# Patient Record
Sex: FEMALE | Race: WHITE | NOT HISPANIC OR LATINO | Employment: OTHER | URBAN - METROPOLITAN AREA
[De-identification: names, ages, dates, MRNs, and addresses within clinical notes are randomized per-mention and may not be internally consistent; named-entity substitution may affect disease eponyms.]

---

## 2017-01-03 LAB
ALBUMIN SERPL BCP-MCNC: 3.9 G/DL (ref 3.5–5)
ALP SERPL-CCNC: 57 U/L (ref 46–116)
ALT SERPL W P-5'-P-CCNC: 33 U/L (ref 12–78)
ANION GAP SERPL CALCULATED.3IONS-SCNC: 11 MMOL/L (ref 4–13)
ANISOCYTOSIS BLD QL SMEAR: PRESENT
AST SERPL W P-5'-P-CCNC: 21 U/L (ref 5–45)
BASOPHILS # BLD MANUAL: 0 THOUSAND/UL (ref 0–0.1)
BASOPHILS NFR MAR MANUAL: 0 % (ref 0–1)
BILIRUB SERPL-MCNC: 1.05 MG/DL (ref 0.2–1)
BUN SERPL-MCNC: 18 MG/DL (ref 5–25)
CALCIUM SERPL-MCNC: 9.9 MG/DL (ref 8.3–10.1)
CHLORIDE SERPL-SCNC: 101 MMOL/L (ref 100–108)
CO2 SERPL-SCNC: 26 MMOL/L (ref 21–32)
CREAT SERPL-MCNC: 1.04 MG/DL (ref 0.6–1.3)
EOSINOPHIL # BLD MANUAL: 0 THOUSAND/UL (ref 0–0.4)
EOSINOPHIL NFR BLD MANUAL: 0 % (ref 0–6)
ERYTHROCYTE [DISTWIDTH] IN BLOOD BY AUTOMATED COUNT: 13.2 % (ref 11.6–15.1)
GFR SERPL CREATININE-BSD FRML MDRD: 51.7 ML/MIN/1.73SQ M
GLUCOSE SERPL-MCNC: 145 MG/DL (ref 65–140)
HCT VFR BLD AUTO: 46.1 % (ref 34.8–46.1)
HGB BLD-MCNC: 16.1 G/DL (ref 11.5–15.4)
LIPASE SERPL-CCNC: 127 U/L (ref 73–393)
LYMPHOCYTES # BLD AUTO: 0.82 THOUSAND/UL (ref 0.6–4.47)
LYMPHOCYTES # BLD AUTO: 6 % (ref 14–44)
MCH RBC QN AUTO: 32.1 PG (ref 26.8–34.3)
MCHC RBC AUTO-ENTMCNC: 34.9 G/DL (ref 31.4–37.4)
MCV RBC AUTO: 92 FL (ref 82–98)
MONOCYTES # BLD AUTO: 0.27 THOUSAND/UL (ref 0–1.22)
MONOCYTES NFR BLD: 2 % (ref 4–12)
NEUTROPHILS # BLD MANUAL: 12.51 THOUSAND/UL (ref 1.85–7.62)
NEUTS SEG NFR BLD AUTO: 92 % (ref 43–75)
NRBC BLD AUTO-RTO: 0 /100 WBCS
PLATELET # BLD AUTO: 315 THOUSANDS/UL (ref 149–390)
PLATELET BLD QL SMEAR: ADEQUATE
PMV BLD AUTO: 9.7 FL (ref 8.9–12.7)
POTASSIUM SERPL-SCNC: 3.9 MMOL/L (ref 3.5–5.3)
PROT SERPL-MCNC: 7.8 G/DL (ref 6.4–8.2)
RBC # BLD AUTO: 5.02 MILLION/UL (ref 3.81–5.12)
RBC MORPH BLD: PRESENT
SODIUM SERPL-SCNC: 138 MMOL/L (ref 136–145)
WBC # BLD AUTO: 13.6 THOUSAND/UL (ref 4.31–10.16)

## 2017-01-03 PROCEDURE — 93005 ELECTROCARDIOGRAM TRACING: CPT

## 2017-01-03 PROCEDURE — 36415 COLL VENOUS BLD VENIPUNCTURE: CPT

## 2017-01-03 PROCEDURE — 85027 COMPLETE CBC AUTOMATED: CPT

## 2017-01-03 PROCEDURE — 83690 ASSAY OF LIPASE: CPT

## 2017-01-03 PROCEDURE — 80053 COMPREHEN METABOLIC PANEL: CPT

## 2017-01-03 PROCEDURE — 85007 BL SMEAR W/DIFF WBC COUNT: CPT

## 2017-01-04 ENCOUNTER — APPOINTMENT (EMERGENCY)
Dept: RADIOLOGY | Facility: HOSPITAL | Age: 77
End: 2017-01-04
Payer: MEDICARE

## 2017-01-04 ENCOUNTER — HOSPITAL ENCOUNTER (OUTPATIENT)
Facility: HOSPITAL | Age: 77
Setting detail: OBSERVATION
Discharge: HOME | End: 2017-01-05
Attending: EMERGENCY MEDICINE | Admitting: COLON & RECTAL SURGERY
Payer: MEDICARE

## 2017-01-04 DIAGNOSIS — K56.609 SMALL BOWEL OBSTRUCTION (HCC): Primary | ICD-10-CM

## 2017-01-04 DIAGNOSIS — R10.84 GENERALIZED ABDOMINAL PAIN: ICD-10-CM

## 2017-01-04 LAB
ATRIAL RATE: 72 BPM
P AXIS: 80 DEGREES
PR INTERVAL: 170 MS
QRS AXIS: 105 DEGREES
QRSD INTERVAL: 114 MS
QT INTERVAL: 456 MS
QTC INTERVAL: 499 MS
T WAVE AXIS: 66 DEGREES
VENTRICULAR RATE: 72 BPM

## 2017-01-04 PROCEDURE — 96361 HYDRATE IV INFUSION ADD-ON: CPT

## 2017-01-04 PROCEDURE — 96374 THER/PROPH/DIAG INJ IV PUSH: CPT

## 2017-01-04 PROCEDURE — 74177 CT ABD & PELVIS W/CONTRAST: CPT

## 2017-01-04 PROCEDURE — 96375 TX/PRO/DX INJ NEW DRUG ADDON: CPT

## 2017-01-04 PROCEDURE — 99285 EMERGENCY DEPT VISIT HI MDM: CPT

## 2017-01-04 RX ORDER — GABAPENTIN 100 MG/1
200 CAPSULE ORAL EVERY EVENING
Status: DISCONTINUED | OUTPATIENT
Start: 2017-01-04 | End: 2017-01-05 | Stop reason: HOSPADM

## 2017-01-04 RX ORDER — FLUOXETINE HYDROCHLORIDE 20 MG/1
40 CAPSULE ORAL DAILY
Status: DISCONTINUED | OUTPATIENT
Start: 2017-01-04 | End: 2017-01-05 | Stop reason: HOSPADM

## 2017-01-04 RX ORDER — ASPIRIN 81 MG/1
81 TABLET, CHEWABLE ORAL DAILY
Status: DISCONTINUED | OUTPATIENT
Start: 2017-01-04 | End: 2017-01-05 | Stop reason: HOSPADM

## 2017-01-04 RX ORDER — DOCUSATE SODIUM 100 MG/1
100 CAPSULE, LIQUID FILLED ORAL 2 TIMES DAILY PRN
Status: DISCONTINUED | OUTPATIENT
Start: 2017-01-04 | End: 2017-01-05

## 2017-01-04 RX ORDER — ASPIRIN 81 MG/1
81 TABLET, CHEWABLE ORAL DAILY
Status: DISCONTINUED | OUTPATIENT
Start: 2017-01-04 | End: 2017-01-04

## 2017-01-04 RX ORDER — AMLODIPINE BESYLATE 5 MG/1
5 TABLET ORAL DAILY
Status: DISCONTINUED | OUTPATIENT
Start: 2017-01-04 | End: 2017-01-04

## 2017-01-04 RX ORDER — LOPERAMIDE HYDROCHLORIDE 2 MG/1
2 CAPSULE ORAL 4 TIMES DAILY PRN
Status: DISCONTINUED | OUTPATIENT
Start: 2017-01-04 | End: 2017-01-05

## 2017-01-04 RX ORDER — LOPERAMIDE HYDROCHLORIDE 2 MG/1
2 CAPSULE ORAL 4 TIMES DAILY PRN
Status: ON HOLD | COMMUNITY
End: 2022-05-12

## 2017-01-04 RX ORDER — ONDANSETRON 2 MG/ML
4 INJECTION INTRAMUSCULAR; INTRAVENOUS ONCE
Status: COMPLETED | OUTPATIENT
Start: 2017-01-04 | End: 2017-01-04

## 2017-01-04 RX ORDER — SODIUM CHLORIDE 9 MG/ML
60 INJECTION, SOLUTION INTRAVENOUS CONTINUOUS
Status: DISCONTINUED | OUTPATIENT
Start: 2017-01-04 | End: 2017-01-05

## 2017-01-04 RX ORDER — AMLODIPINE BESYLATE 5 MG/1
5 TABLET ORAL DAILY
Status: DISCONTINUED | OUTPATIENT
Start: 2017-01-04 | End: 2017-01-05 | Stop reason: HOSPADM

## 2017-01-04 RX ORDER — KETOROLAC TROMETHAMINE 30 MG/ML
15 INJECTION, SOLUTION INTRAMUSCULAR; INTRAVENOUS ONCE
Status: COMPLETED | OUTPATIENT
Start: 2017-01-04 | End: 2017-01-04

## 2017-01-04 RX ADMIN — SODIUM CHLORIDE 1000 ML: 0.9 INJECTION, SOLUTION INTRAVENOUS at 01:40

## 2017-01-04 RX ADMIN — KETOROLAC TROMETHAMINE 15 MG: 30 INJECTION, SOLUTION INTRAMUSCULAR at 04:15

## 2017-01-04 RX ADMIN — AMLODIPINE BESYLATE 5 MG: 5 TABLET ORAL at 11:54

## 2017-01-04 RX ADMIN — GABAPENTIN 200 MG: 100 CAPSULE ORAL at 19:53

## 2017-01-04 RX ADMIN — FLUOXETINE 40 MG: 20 CAPSULE ORAL at 11:55

## 2017-01-04 RX ADMIN — ENOXAPARIN SODIUM 40 MG: 40 INJECTION SUBCUTANEOUS at 11:55

## 2017-01-04 RX ADMIN — ASPIRIN 81 MG 81 MG: 81 TABLET ORAL at 11:54

## 2017-01-04 RX ADMIN — ONDANSETRON 4 MG: 2 INJECTION INTRAMUSCULAR; INTRAVENOUS at 02:42

## 2017-01-04 RX ADMIN — SODIUM CHLORIDE 60 ML/HR: 0.9 INJECTION, SOLUTION INTRAVENOUS at 08:40

## 2017-01-04 RX ADMIN — IOHEXOL 100 ML: 350 INJECTION, SOLUTION INTRAVENOUS at 02:06

## 2017-01-05 VITALS
TEMPERATURE: 97.7 F | RESPIRATION RATE: 18 BRPM | OXYGEN SATURATION: 94 % | SYSTOLIC BLOOD PRESSURE: 170 MMHG | HEIGHT: 63 IN | WEIGHT: 135.14 LBS | DIASTOLIC BLOOD PRESSURE: 89 MMHG | HEART RATE: 69 BPM | BODY MASS INDEX: 23.95 KG/M2

## 2017-01-05 PROBLEM — R10.9 ABDOMINAL PAIN: Status: ACTIVE | Noted: 2017-01-05

## 2017-01-05 LAB
ANION GAP SERPL CALCULATED.3IONS-SCNC: 5 MMOL/L (ref 4–13)
BASOPHILS # BLD AUTO: 0.04 THOUSANDS/ΜL (ref 0–0.1)
BASOPHILS NFR BLD AUTO: 1 % (ref 0–1)
BUN SERPL-MCNC: 12 MG/DL (ref 5–25)
CALCIUM SERPL-MCNC: 8.3 MG/DL (ref 8.3–10.1)
CHLORIDE SERPL-SCNC: 106 MMOL/L (ref 100–108)
CO2 SERPL-SCNC: 29 MMOL/L (ref 21–32)
CREAT SERPL-MCNC: 0.78 MG/DL (ref 0.6–1.3)
EOSINOPHIL # BLD AUTO: 0.23 THOUSAND/ΜL (ref 0–0.61)
EOSINOPHIL NFR BLD AUTO: 4 % (ref 0–6)
ERYTHROCYTE [DISTWIDTH] IN BLOOD BY AUTOMATED COUNT: 13.2 % (ref 11.6–15.1)
GFR SERPL CREATININE-BSD FRML MDRD: >60 ML/MIN/1.73SQ M
GLUCOSE SERPL-MCNC: 87 MG/DL (ref 65–140)
HCT VFR BLD AUTO: 41 % (ref 34.8–46.1)
HGB BLD-MCNC: 13.6 G/DL (ref 11.5–15.4)
LYMPHOCYTES # BLD AUTO: 1.75 THOUSANDS/ΜL (ref 0.6–4.47)
LYMPHOCYTES NFR BLD AUTO: 28 % (ref 14–44)
MAGNESIUM SERPL-MCNC: 2.2 MG/DL (ref 1.6–2.6)
MCH RBC QN AUTO: 31 PG (ref 26.8–34.3)
MCHC RBC AUTO-ENTMCNC: 33.2 G/DL (ref 31.4–37.4)
MCV RBC AUTO: 93 FL (ref 82–98)
MONOCYTES # BLD AUTO: 0.55 THOUSAND/ΜL (ref 0.17–1.22)
MONOCYTES NFR BLD AUTO: 9 % (ref 4–12)
NEUTROPHILS # BLD AUTO: 3.57 THOUSANDS/ΜL (ref 1.85–7.62)
NEUTS SEG NFR BLD AUTO: 58 % (ref 43–75)
NRBC BLD AUTO-RTO: 0 /100 WBCS
PLATELET # BLD AUTO: 240 THOUSANDS/UL (ref 149–390)
PMV BLD AUTO: 9.9 FL (ref 8.9–12.7)
POTASSIUM SERPL-SCNC: 3.7 MMOL/L (ref 3.5–5.3)
RBC # BLD AUTO: 4.39 MILLION/UL (ref 3.81–5.12)
SODIUM SERPL-SCNC: 140 MMOL/L (ref 136–145)
WBC # BLD AUTO: 6.16 THOUSAND/UL (ref 4.31–10.16)

## 2017-01-05 PROCEDURE — 85025 COMPLETE CBC W/AUTO DIFF WBC: CPT | Performed by: SURGERY

## 2017-01-05 PROCEDURE — 80048 BASIC METABOLIC PNL TOTAL CA: CPT | Performed by: SURGERY

## 2017-01-05 PROCEDURE — 83735 ASSAY OF MAGNESIUM: CPT | Performed by: SURGERY

## 2017-01-05 RX ORDER — HEPARIN SODIUM 5000 [USP'U]/ML
5000 INJECTION, SOLUTION INTRAVENOUS; SUBCUTANEOUS EVERY 8 HOURS SCHEDULED
Status: DISCONTINUED | OUTPATIENT
Start: 2017-01-05 | End: 2017-01-05 | Stop reason: HOSPADM

## 2017-01-05 RX ADMIN — AMLODIPINE BESYLATE 5 MG: 5 TABLET ORAL at 08:14

## 2017-01-05 RX ADMIN — FLUOXETINE 40 MG: 20 CAPSULE ORAL at 08:14

## 2017-01-05 RX ADMIN — HEPARIN SODIUM 5000 UNITS: 5000 INJECTION, SOLUTION INTRAVENOUS; SUBCUTANEOUS at 06:02

## 2017-01-05 RX ADMIN — ASPIRIN 81 MG 81 MG: 81 TABLET ORAL at 08:14

## 2017-01-17 ENCOUNTER — GENERIC CONVERSION - ENCOUNTER (OUTPATIENT)
Dept: OTHER | Facility: OTHER | Age: 77
End: 2017-01-17

## 2017-02-27 ENCOUNTER — LAB REQUISITION (OUTPATIENT)
Dept: LAB | Facility: HOSPITAL | Age: 77
End: 2017-02-27
Payer: MEDICARE

## 2017-02-27 ENCOUNTER — ALLSCRIPTS OFFICE VISIT (OUTPATIENT)
Dept: OTHER | Facility: OTHER | Age: 77
End: 2017-02-27

## 2017-02-27 DIAGNOSIS — Z01.419 ENCOUNTER FOR GYNECOLOGICAL EXAMINATION WITHOUT ABNORMAL FINDING: ICD-10-CM

## 2017-02-27 PROCEDURE — G0145 SCR C/V CYTO,THINLAYER,RESCR: HCPCS | Performed by: OBSTETRICS & GYNECOLOGY

## 2017-03-06 ENCOUNTER — GENERIC CONVERSION - ENCOUNTER (OUTPATIENT)
Dept: OTHER | Facility: OTHER | Age: 77
End: 2017-03-06

## 2017-03-06 LAB
LAB AP GYN PRIMARY INTERPRETATION: NORMAL
Lab: NORMAL

## 2017-03-30 ENCOUNTER — TRANSCRIBE ORDERS (OUTPATIENT)
Dept: ADMINISTRATIVE | Facility: HOSPITAL | Age: 77
End: 2017-03-30

## 2017-03-30 DIAGNOSIS — Z12.31 VISIT FOR SCREENING MAMMOGRAM: Primary | ICD-10-CM

## 2017-04-26 DIAGNOSIS — Z12.31 ENCOUNTER FOR SCREENING MAMMOGRAM FOR MALIGNANT NEOPLASM OF BREAST: ICD-10-CM

## 2017-05-24 ENCOUNTER — HOSPITAL ENCOUNTER (OUTPATIENT)
Dept: BONE DENSITY | Facility: IMAGING CENTER | Age: 77
Discharge: HOME | End: 2017-05-24
Payer: MEDICARE

## 2017-05-24 DIAGNOSIS — Z12.31 ENCOUNTER FOR SCREENING MAMMOGRAM FOR MALIGNANT NEOPLASM OF BREAST: ICD-10-CM

## 2017-05-24 PROCEDURE — G0202 SCR MAMMO BI INCL CAD: HCPCS

## 2017-09-13 ENCOUNTER — TRANSCRIBE ORDERS (OUTPATIENT)
Dept: LAB | Facility: CLINIC | Age: 77
End: 2017-09-13

## 2017-09-13 ENCOUNTER — APPOINTMENT (OUTPATIENT)
Dept: LAB | Facility: CLINIC | Age: 77
End: 2017-09-13
Payer: MEDICARE

## 2017-09-13 DIAGNOSIS — M25.561 RIGHT KNEE PAIN, UNSPECIFIED CHRONICITY: Primary | ICD-10-CM

## 2017-09-13 DIAGNOSIS — E78.5 OTHER AND UNSPECIFIED HYPERLIPIDEMIA: ICD-10-CM

## 2017-09-13 DIAGNOSIS — M25.561 RIGHT KNEE PAIN, UNSPECIFIED CHRONICITY: ICD-10-CM

## 2017-09-13 DIAGNOSIS — I10 UNSPECIFIED ESSENTIAL HYPERTENSION: ICD-10-CM

## 2017-09-13 LAB
ALBUMIN SERPL BCP-MCNC: 3.3 G/DL (ref 3.5–5)
ALP SERPL-CCNC: 46 U/L (ref 46–116)
ALT SERPL W P-5'-P-CCNC: 33 U/L (ref 12–78)
ANION GAP SERPL CALCULATED.3IONS-SCNC: 6 MMOL/L (ref 4–13)
AST SERPL W P-5'-P-CCNC: 36 U/L (ref 5–45)
BILIRUB SERPL-MCNC: 0.46 MG/DL (ref 0.2–1)
BUN SERPL-MCNC: 20 MG/DL (ref 5–25)
CALCIUM SERPL-MCNC: 9.2 MG/DL (ref 8.3–10.1)
CHLORIDE SERPL-SCNC: 106 MMOL/L (ref 100–108)
CO2 SERPL-SCNC: 28 MMOL/L (ref 21–32)
CREAT SERPL-MCNC: 0.86 MG/DL (ref 0.6–1.3)
GFR SERPL CREATININE-BSD FRML MDRD: 66 ML/MIN/1.73SQ M
GLUCOSE P FAST SERPL-MCNC: 85 MG/DL (ref 65–99)
POTASSIUM SERPL-SCNC: 4.2 MMOL/L (ref 3.5–5.3)
PROT SERPL-MCNC: 6.9 G/DL (ref 6.4–8.2)
SODIUM SERPL-SCNC: 140 MMOL/L (ref 136–145)

## 2017-09-13 PROCEDURE — 80053 COMPREHEN METABOLIC PANEL: CPT

## 2017-09-13 PROCEDURE — 36415 COLL VENOUS BLD VENIPUNCTURE: CPT

## 2018-01-10 NOTE — PROCEDURES
Procedures by Adela Cruz MD at 2016   3:34 PM      Author:  Adela Cruz MD Service:  Neurology Author Type:  Physician     Filed:  2016  3:37 PM Date of Service:  2016  3:34 PM Status:  Signed     :  Adela Cruz MD (Physician)            ELECTROENCEPHALOGRAM (EEG)      Patient Name:  Unruly Melo  MRN: [id number]   :  1940 File #: Albina Bene 17-80   Age: 77 y o  Encounter #: [id number]   Date performed: 2016            Report date: 2016          Study type: Routine EEG    ICD 10 diagnosis: Transient alteration of awareness R40 4    Start time: 14:27 End time: 14:57     -------------------------------------------------------------------------------------------------------------------   Patient History: This recording was observed in a 77 y o  female with an episode of transient  global amnesia to evaluate for risk of seizure  Medications include:   amLODIPine 5 mg Oral Daily   aspirin 81 mg Oral Daily   atorvastatin 20 mg Oral Daily With Dinner   calcium carbonate-vitamin D 1 tablet Oral Daily   cholecalciferol 1,000 Units Oral Daily   vitamin B-12 500 mcg Oral Daily   enoxaparin 40 mg Subcutaneous Daily   fish oil 2,000 mg Oral Daily   FLUoxetine 40 mg Oral Daily   gabapentin 200 mg Oral QPM   loperamide 2 mg Oral BID   multivitamin-minerals 1 tablet Oral Daily       -------------------------------------------------------------------------------------------------------------------   Description of Procedure:  ·  32 channel digital recording with electrodes placed according to the International 10-20 system with additional  T1/T2 electrodes, EOG, EKG, and simultaneous  video  The recording was technically satisfactory  -------------------------------------------------------------------------------------------------------------------   EEG Description:    The recording was performed with the patient awake, drowsy, and asleep  She was fully oriented      During wakefulness, there were long runs of well regulated, low amplitude, posteriorly  dominant, symmetric 9-10 cps alpha rhythm that attenuated with eye opening  There were symmetric low amplitude, frontally dominant beta activities  With drowsiness, alpha activity attenuated and was replaced by diffusely distributed theta and beta activities  With sleep, symmetric vertex sharp waves,  POSTS, and sleep spindles were present      -------------------------------------------------------------------------------------------------------------------   Activation Procedures:  Hyperventilation was not performed  Stepped photic stimulation between 1-20 cps was performed and induced symmetric  photic driving  Other findings: The single lead ECG demonstrated a regular rhythm     -------------------------------------------------------------------------------------------------------------------   EEG Interpretation: This Routine EEG recorded during wakefulness, drowsiness, and sleep is normal         Victor Hugo Rowell MD   [address] [facility]               Received for:Andrew WELCH    Jul 11 2016  3:36PM [facility] Standard Time

## 2018-01-10 NOTE — RESULT NOTES
Verified Results  * MAMMO SCREENING BILATERAL W CAD [id number] 10:29AM Quillian Civil Order Number: [id number]    - Patient Instructions: To schedule this appointment, please contact Central Scheduling at [phone]  Do not wear any perfume, powder, lotion or deodorant on breast or underarm area  Please bring your doctors order, referral (if needed) and insurance information with you on the day of the test  Failure to bring this information may result in this test being rescheduled  Arrive 15 minutes prior to your appointment time to register  On the day of your test, please bring any prior mammogram or breast studies with you that were not performed at a [facility]  Failure to bring prior exams may result in your test needing to be rescheduled  Test Name Result Flag Reference   MAMMO SCREENING BILATERAL W CAD (Report)     Patient History:   Patient is postmenopausal    Family history of prostate cancer at age 80 in father, breast    cancer at age 76 in maternal aunt  Excisional biopsy of the left breast    Patient is a former smoker  Patient's BMI is 24 3  Reason for exam: screening, asymptomatic  Mammo Screening Bilateral W CAD: May 24, 2017 - Check In #:    [de-identified]   Bilateral MLO and CC view(s) were taken  Technologist: JOHANA Michelle (JOHANA)(M)   Prior study comparison: April 26, 2016, mammo screening bilateral   W CAD performed at [facility]  April 22, 2015, bilateral OPMA digital scrn mammo w/CAD    performed at [address]  April 11, 2014, bilateral OPMA digital scrn mammo w/CAD performed at    [facility]  April 10, 2013,    bilateral OPMA digital scrn mammo w/CAD performed at [facility]  There are scattered fibroglandular densities  The parenchymal pattern appears stable   No dominant soft tissue    mass or suspicious calcifications are noted  The skin and nipple   contours are within normal limits  No mammographic evidence of malignancy  No    significant changes when compared with prior studies  ACR BI-RADSï¾® Assessments: BiRad:1 - Negative     Recommendation:   Routine screening mammogram in 1 year  A reminder letter will be   scheduled  Analyzed by CAD     8-10% of cancers will be missed on mammography  Management of a    palpable abnormality must be based on clinical grounds  Patients   will be notified of their results via letter from our facility  Accredited by [employer] and FDA       Transcription Location: [address] [place] [address]: [id number]     Risk Value(s):   Tyrer-Cuzick 10 Year: 2 500%, Tyrer-Cuzick Lifetime: 2 500%,    Myriad Table: 1 5%, JELLY 5 Year: 2 3%, NCI Lifetime: 4 9%   Signed by:   Sharyle Ben, MD   5/24/17

## 2018-01-10 NOTE — RESULT NOTES
Verified Results  (1) THIN PREP PAP WITH IMAGING [id number] 11:40AM Karan Singleton Order Number: [id number]     Test Name Result Flag Reference   LAB AP CASE REPORT (Report)     Gynecologic Cytology Report            Case: [id number]                  Authorizing Provider: Radha Leslie MD     Collected:      02/27/2017 1140        First Screen:     GAVI Lopez    Received:      02/28/2017 1522        Specimen:  LIQUID-BASED PAP, SCREENING, Endocervical   LAB AP GYN PRIMARY INTERPRETATION      Negative for intraepithelial lesion or malignancy  Electronically signed by GAVI Lopez on 3/6/2017 at 2:22 PM   LAB AP GYN SPECIMEN ADEQUACY      Satisfactory for evaluation  Endocervical/transformation zone component present  LAB AP GYN ADDITIONAL INFORMATION (Report)     [employer]'s FDA approved ,  and ThinPrep Imaging System are   utilized with strict adherence to the 's instruction manual to   prepare gynecologic and non-gynecologic cytology specimens for the   production of ThinPrep slides as well as for gynecologic ThinPrep imaging  These processes have been validated by our laboratory and/or by the     The Pap test is not a diagnostic procedure and should not be used as the   sole means to detect cervical cancer  It is only a screening procedure to   aid in the detection of cervical cancer and its precursors  Both   false-negative and false-positive results have been experienced  Your   patient's test result should be interpreted in this context together with   the history and clinical findings     LAB AP LMP

## 2018-01-10 NOTE — RESULT NOTES
Verified Results  * MAMMO SCREENING BILATERAL W CAD 26Apr2016 10:54AM Shayla Desai Order Number: [id number]     Test Name Result Flag Reference   MAMMO SCREENING BILATERAL W CAD (Report)     Patient History:   Patient is postmenopausal    Family history of prostate cancer in father at age 80 and breast    cancer in maternal aunt at age 76  Excisional biopsy of the left breast    Patient is a former smoker  Patient's BMI is 24 3  Reason for exam: screening (asymptomatic)  Mammo Screening Bilateral W CAD: April 26, 2016 - Check In #:    [de-identified]   Bilateral MLO, CC, and XCCL view(s) were taken  Technologist: JOHANA Bryant (R)(M)   Prior study comparison: April 22, 2015, bilateral OPMA digital    scrn mammo w/CAD performed at [id number] [place] [address] [address]  April 11, 2014, bilateral OPMA digital scrn    mammo w/CAD performed at [address] [address]  April 10, 2013, bilateral OPMA digital scrn mammo w/CAD    performed at [address] [address]  March 29, 2012, bilateral OPMA digital scrn mammo w/CAD performed at    [address] [address]  March 3, 2011,    bilateral OPMA digital scrn mammo w/CAD performed at [address] [address]  There are scattered fibroglandular densities  The parenchymal pattern appears stable  No dominant soft tissue    mass or suspicious calcifications are noted  The skin and nipple   contours are within normal limits  No mammographic evidence of malignancy  No    significant changes when compared with prior studies  ASSESSMENT: BiRad:1 - Negative     Recommendation:   Routine screening mammogram in 1 year  A reminder letter will be   scheduled  Analyzed by CAD     8-10% of cancers will be missed on mammography  Management of a    palpable abnormality must be based on clinical grounds   Patients   will be notified of their results via letter from our facility  Accredited by [employer] and FDA       Transcription Location: JOHANA Aguilar 98: [id number]     Risk Value(s):   Tyrer-Cuzick 10 Year: 2 845%, Tyrer-Cuzick Lifetime: 3 162%,    Myriad Table: 1 5%, JELLY 5 Year: 2 3%, NCI Lifetime: 5 2%   Signed by:   Amilcar Bonds MD   4/26/16

## 2018-01-13 VITALS
BODY MASS INDEX: 22.93 KG/M2 | SYSTOLIC BLOOD PRESSURE: 130 MMHG | DIASTOLIC BLOOD PRESSURE: 76 MMHG | HEIGHT: 64 IN | WEIGHT: 134.31 LBS

## 2018-05-04 ENCOUNTER — TRANSCRIBE ORDERS (OUTPATIENT)
Dept: ADMINISTRATIVE | Facility: HOSPITAL | Age: 78
End: 2018-05-04

## 2018-05-04 DIAGNOSIS — Z12.39 BREAST SCREENING: Primary | ICD-10-CM

## 2018-05-29 ENCOUNTER — HOSPITAL ENCOUNTER (OUTPATIENT)
Dept: BONE DENSITY | Facility: IMAGING CENTER | Age: 78
Discharge: HOME | End: 2018-05-29
Payer: MEDICARE

## 2018-05-29 DIAGNOSIS — Z12.39 BREAST SCREENING: ICD-10-CM

## 2018-05-29 PROCEDURE — 77067 SCR MAMMO BI INCL CAD: CPT

## 2018-07-26 ENCOUNTER — TELEPHONE (OUTPATIENT)
Dept: OBGYN CLINIC | Facility: CLINIC | Age: 78
End: 2018-07-26

## 2018-07-26 NOTE — TELEPHONE ENCOUNTER
----- Message from Christofer Carolina MD sent at 7/26/2018  9:15 AM EDT -----  Please call the patient regarding her DEXA scan  Her DEXA scan showed evidence of osteopenia  This is best treated with calcium vitamin-D and exercise

## 2019-03-08 ENCOUNTER — ANNUAL EXAM (OUTPATIENT)
Dept: OBGYN CLINIC | Facility: CLINIC | Age: 79
End: 2019-03-08
Payer: MEDICARE

## 2019-03-08 VITALS
SYSTOLIC BLOOD PRESSURE: 130 MMHG | DIASTOLIC BLOOD PRESSURE: 72 MMHG | WEIGHT: 130 LBS | BODY MASS INDEX: 23.04 KG/M2 | HEIGHT: 63 IN

## 2019-03-08 DIAGNOSIS — Z12.31 ENCOUNTER FOR SCREENING MAMMOGRAM FOR MALIGNANT NEOPLASM OF BREAST: Primary | ICD-10-CM

## 2019-03-08 DIAGNOSIS — Z01.419 ENCOUNTER FOR GYNECOLOGICAL EXAMINATION (GENERAL) (ROUTINE) WITHOUT ABNORMAL FINDINGS: ICD-10-CM

## 2019-03-08 PROCEDURE — G0101 CA SCREEN;PELVIC/BREAST EXAM: HCPCS | Performed by: OBSTETRICS & GYNECOLOGY

## 2019-03-08 PROCEDURE — G0145 SCR C/V CYTO,THINLAYER,RESCR: HCPCS | Performed by: OBSTETRICS & GYNECOLOGY

## 2019-03-08 RX ORDER — LOPERAMIDE HYDROCHLORIDE 2 MG/1
TABLET ORAL
COMMUNITY
Start: 2013-01-16 | End: 2019-03-08

## 2019-03-08 RX ORDER — DULOXETIN HYDROCHLORIDE 20 MG/1
20 CAPSULE, DELAYED RELEASE ORAL DAILY
COMMUNITY

## 2019-03-08 RX ORDER — GABAPENTIN 100 MG/1
CAPSULE ORAL DAILY
COMMUNITY
Start: 2011-04-02

## 2019-03-08 RX ORDER — CALCIUM CARBONATE/VITAMIN D3 600 MG-10
TABLET ORAL
COMMUNITY
End: 2019-03-08

## 2019-03-08 RX ORDER — AMLODIPINE BESYLATE 5 MG/1
1 TABLET ORAL DAILY
COMMUNITY
End: 2019-03-08

## 2019-03-08 RX ORDER — TRAZODONE HYDROCHLORIDE 150 MG/1
TABLET ORAL
Refills: 5 | COMMUNITY
Start: 2019-02-22

## 2019-03-08 NOTE — PROGRESS NOTES
Assessment/Plan: This 60-year-old patient is seen today for gyn evaluation  She does wear liners daily for urine stress incontinence  No problem-specific Assessment & Plan notes found for this encounter  Subjective:      Patient ID: Tanika Kruger is a 69 y o  female  Review of Systems   Constitutional: Negative  HENT: Negative  Eyes: Negative  Respiratory: Negative  Cardiovascular: Negative  Gastrointestinal: Negative  She does have 5-6 bowel movements daily  Endocrine: Negative  Genitourinary: Negative  She does wear liners daily for urine stress incontinence  Musculoskeletal: Negative  Skin: Negative  Allergic/Immunologic: Negative  Neurological: Negative  Hematological: Negative  Psychiatric/Behavioral: Negative  Objective:      /72 (BP Location: Left arm, Patient Position: Sitting, Cuff Size: Standard)   Ht 5' 2 5" (1 588 m)   Wt 59 kg (130 lb)   BMI 23 40 kg/m²          Physical Exam   Constitutional: She is oriented to person, place, and time  She appears well-developed and well-nourished  HENT:   Head: Normocephalic  Neck: Normal range of motion  Neck supple  Cardiovascular: Normal rate, regular rhythm, normal heart sounds and intact distal pulses  Pulmonary/Chest: Effort normal and breath sounds normal    Abdominal: Soft  Bowel sounds are normal    Genitourinary: Uterus normal    Musculoskeletal: Normal range of motion  Neurological: She is alert and oriented to person, place, and time  Skin: Skin is warm and dry  Psychiatric: She has a normal mood and affect  Nursing note and vitals reviewed  breast exam is normal  Pelvic exam reveals uterus to be anterior mobile normal size and well supported  No adnexal masses are present  The cervix is normal to appearance  The vaginal mucosa is atrophic  There is a caruncle at the urethra    Vulvar structures are normal  Rectal exam shows no masses or blood in the rectum and no nodularity in the cul-de-sac

## 2019-03-08 NOTE — PATIENT INSTRUCTIONS
This 67-year-old patient was told that her breast and pelvic exam are normal she will call if she sees any vaginal bleeding

## 2019-03-12 LAB
LAB AP GYN PRIMARY INTERPRETATION: NORMAL
Lab: NORMAL

## 2019-06-11 ENCOUNTER — HOSPITAL ENCOUNTER (OUTPATIENT)
Dept: BONE DENSITY | Facility: IMAGING CENTER | Age: 79
Discharge: HOME | End: 2019-06-11
Payer: MEDICARE

## 2019-06-11 VITALS — BODY MASS INDEX: 22.15 KG/M2 | HEIGHT: 63 IN | WEIGHT: 125 LBS

## 2019-06-11 DIAGNOSIS — Z12.31 ENCOUNTER FOR SCREENING MAMMOGRAM FOR MALIGNANT NEOPLASM OF BREAST: ICD-10-CM

## 2019-06-11 PROCEDURE — 77067 SCR MAMMO BI INCL CAD: CPT

## 2020-05-21 ENCOUNTER — TRANSCRIBE ORDERS (OUTPATIENT)
Dept: ADMINISTRATIVE | Facility: HOSPITAL | Age: 80
End: 2020-05-21

## 2020-05-21 DIAGNOSIS — M85.88 OSTEOPENIA OF OTHER SITE: ICD-10-CM

## 2020-05-21 DIAGNOSIS — Z12.31 ENCOUNTER FOR SCREENING MAMMOGRAM FOR MALIGNANT NEOPLASM OF BREAST: Primary | ICD-10-CM

## 2020-06-22 ENCOUNTER — HOSPITAL ENCOUNTER (OUTPATIENT)
Dept: BONE DENSITY | Facility: IMAGING CENTER | Age: 80
Discharge: HOME | End: 2020-06-22
Payer: MEDICARE

## 2020-06-22 VITALS — WEIGHT: 122 LBS | HEIGHT: 63 IN | BODY MASS INDEX: 21.62 KG/M2

## 2020-06-22 DIAGNOSIS — Z12.31 ENCOUNTER FOR SCREENING MAMMOGRAM FOR MALIGNANT NEOPLASM OF BREAST: ICD-10-CM

## 2020-06-22 DIAGNOSIS — M85.88 OSTEOPENIA OF OTHER SITE: ICD-10-CM

## 2020-06-22 PROCEDURE — 77067 SCR MAMMO BI INCL CAD: CPT

## 2020-06-22 PROCEDURE — 77063 BREAST TOMOSYNTHESIS BI: CPT

## 2020-06-22 PROCEDURE — 77080 DXA BONE DENSITY AXIAL: CPT

## 2021-01-21 ENCOUNTER — IMMUNIZATIONS (OUTPATIENT)
Dept: FAMILY MEDICINE CLINIC | Facility: HOSPITAL | Age: 81
End: 2021-01-21

## 2021-01-21 DIAGNOSIS — Z23 ENCOUNTER FOR IMMUNIZATION: Primary | ICD-10-CM

## 2021-01-21 PROCEDURE — 0001A SARS-COV-2 / COVID-19 MRNA VACCINE (PFIZER-BIONTECH) 30 MCG: CPT

## 2021-01-21 PROCEDURE — 91300 SARS-COV-2 / COVID-19 MRNA VACCINE (PFIZER-BIONTECH) 30 MCG: CPT

## 2021-02-11 ENCOUNTER — IMMUNIZATIONS (OUTPATIENT)
Dept: FAMILY MEDICINE CLINIC | Facility: HOSPITAL | Age: 81
End: 2021-02-11

## 2021-02-11 DIAGNOSIS — Z23 ENCOUNTER FOR IMMUNIZATION: Primary | ICD-10-CM

## 2021-02-11 PROCEDURE — 91300 SARS-COV-2 / COVID-19 MRNA VACCINE (PFIZER-BIONTECH) 30 MCG: CPT

## 2021-02-11 PROCEDURE — 0002A SARS-COV-2 / COVID-19 MRNA VACCINE (PFIZER-BIONTECH) 30 MCG: CPT

## 2021-08-12 ENCOUNTER — HOSPITAL ENCOUNTER (OUTPATIENT)
Dept: MAMMOGRAPHY | Facility: IMAGING CENTER | Age: 81
Discharge: HOME | End: 2021-08-12
Payer: MEDICARE

## 2021-08-12 VITALS — WEIGHT: 122 LBS | HEIGHT: 63 IN | BODY MASS INDEX: 21.62 KG/M2

## 2021-08-12 DIAGNOSIS — Z12.31 ENCOUNTER FOR SCREENING MAMMOGRAM FOR MALIGNANT NEOPLASM OF BREAST: ICD-10-CM

## 2021-08-12 PROCEDURE — 77067 SCR MAMMO BI INCL CAD: CPT

## 2021-08-12 PROCEDURE — 77063 BREAST TOMOSYNTHESIS BI: CPT

## 2022-05-11 ENCOUNTER — APPOINTMENT (EMERGENCY)
Dept: RADIOLOGY | Facility: HOSPITAL | Age: 82
DRG: 390 | End: 2022-05-11
Payer: MEDICARE

## 2022-05-11 ENCOUNTER — APPOINTMENT (INPATIENT)
Dept: RADIOLOGY | Facility: HOSPITAL | Age: 82
DRG: 390 | End: 2022-05-11
Payer: MEDICARE

## 2022-05-11 ENCOUNTER — HOSPITAL ENCOUNTER (INPATIENT)
Facility: HOSPITAL | Age: 82
LOS: 1 days | Discharge: HOME | DRG: 390 | End: 2022-05-12
Attending: EMERGENCY MEDICINE | Admitting: COLON & RECTAL SURGERY
Payer: MEDICARE

## 2022-05-11 DIAGNOSIS — K56.609 SBO (SMALL BOWEL OBSTRUCTION) (HCC): Primary | ICD-10-CM

## 2022-05-11 DIAGNOSIS — R11.2 NAUSEA AND VOMITING, UNSPECIFIED VOMITING TYPE: ICD-10-CM

## 2022-05-11 DIAGNOSIS — R10.33 PERIUMBILICAL ABDOMINAL PAIN: ICD-10-CM

## 2022-05-11 LAB
ALBUMIN SERPL BCP-MCNC: 3.7 G/DL (ref 3.5–5)
ALP SERPL-CCNC: 46 U/L (ref 46–116)
ALT SERPL W P-5'-P-CCNC: 38 U/L (ref 12–78)
ANION GAP SERPL CALCULATED.3IONS-SCNC: 4 MMOL/L (ref 4–13)
AST SERPL W P-5'-P-CCNC: 56 U/L (ref 5–45)
BASOPHILS # BLD AUTO: 0.03 THOUSANDS/ΜL (ref 0–0.1)
BASOPHILS NFR BLD AUTO: 0 % (ref 0–1)
BILIRUB SERPL-MCNC: 0.8 MG/DL (ref 0.2–1)
BUN SERPL-MCNC: 25 MG/DL (ref 5–25)
CALCIUM SERPL-MCNC: 9.5 MG/DL (ref 8.3–10.1)
CHLORIDE SERPL-SCNC: 100 MMOL/L (ref 100–108)
CO2 SERPL-SCNC: 31 MMOL/L (ref 21–32)
CREAT SERPL-MCNC: 0.98 MG/DL (ref 0.6–1.3)
EOSINOPHIL # BLD AUTO: 0.01 THOUSAND/ΜL (ref 0–0.61)
EOSINOPHIL NFR BLD AUTO: 0 % (ref 0–6)
ERYTHROCYTE [DISTWIDTH] IN BLOOD BY AUTOMATED COUNT: 13.5 % (ref 11.6–15.1)
GFR SERPL CREATININE-BSD FRML MDRD: 54 ML/MIN/1.73SQ M
GLUCOSE SERPL-MCNC: 146 MG/DL (ref 65–140)
HCT VFR BLD AUTO: 43.2 % (ref 34.8–46.1)
HGB BLD-MCNC: 13.9 G/DL (ref 11.5–15.4)
IMM GRANULOCYTES # BLD AUTO: 0.03 THOUSAND/UL (ref 0–0.2)
IMM GRANULOCYTES NFR BLD AUTO: 0 % (ref 0–2)
LIPASE SERPL-CCNC: 143 U/L (ref 73–393)
LYMPHOCYTES # BLD AUTO: 0.57 THOUSANDS/ΜL (ref 0.6–4.47)
LYMPHOCYTES NFR BLD AUTO: 5 % (ref 14–44)
MCH RBC QN AUTO: 29.5 PG (ref 26.8–34.3)
MCHC RBC AUTO-ENTMCNC: 32.2 G/DL (ref 31.4–37.4)
MCV RBC AUTO: 92 FL (ref 82–98)
MONOCYTES # BLD AUTO: 0.34 THOUSAND/ΜL (ref 0.17–1.22)
MONOCYTES NFR BLD AUTO: 3 % (ref 4–12)
NEUTROPHILS # BLD AUTO: 10.44 THOUSANDS/ΜL (ref 1.85–7.62)
NEUTS SEG NFR BLD AUTO: 92 % (ref 43–75)
NRBC BLD AUTO-RTO: 0 /100 WBCS
PLATELET # BLD AUTO: 287 THOUSANDS/UL (ref 149–390)
PMV BLD AUTO: 10.6 FL (ref 8.9–12.7)
POTASSIUM SERPL-SCNC: 4.1 MMOL/L (ref 3.5–5.3)
PROT SERPL-MCNC: 7.3 G/DL (ref 6.4–8.2)
RBC # BLD AUTO: 4.71 MILLION/UL (ref 3.81–5.12)
SODIUM SERPL-SCNC: 135 MMOL/L (ref 136–145)
WBC # BLD AUTO: 11.42 THOUSAND/UL (ref 4.31–10.16)

## 2022-05-11 PROCEDURE — 83690 ASSAY OF LIPASE: CPT | Performed by: EMERGENCY MEDICINE

## 2022-05-11 PROCEDURE — 1123F ACP DISCUSS/DSCN MKR DOCD: CPT | Performed by: EMERGENCY MEDICINE

## 2022-05-11 PROCEDURE — G1004 CDSM NDSC: HCPCS

## 2022-05-11 PROCEDURE — 96376 TX/PRO/DX INJ SAME DRUG ADON: CPT

## 2022-05-11 PROCEDURE — 80053 COMPREHEN METABOLIC PANEL: CPT | Performed by: EMERGENCY MEDICINE

## 2022-05-11 PROCEDURE — 96374 THER/PROPH/DIAG INJ IV PUSH: CPT

## 2022-05-11 PROCEDURE — 99285 EMERGENCY DEPT VISIT HI MDM: CPT

## 2022-05-11 PROCEDURE — 96361 HYDRATE IV INFUSION ADD-ON: CPT

## 2022-05-11 PROCEDURE — 99285 EMERGENCY DEPT VISIT HI MDM: CPT | Performed by: EMERGENCY MEDICINE

## 2022-05-11 PROCEDURE — 71045 X-RAY EXAM CHEST 1 VIEW: CPT

## 2022-05-11 PROCEDURE — 74176 CT ABD & PELVIS W/O CONTRAST: CPT

## 2022-05-11 PROCEDURE — 36415 COLL VENOUS BLD VENIPUNCTURE: CPT | Performed by: EMERGENCY MEDICINE

## 2022-05-11 PROCEDURE — 85025 COMPLETE CBC W/AUTO DIFF WBC: CPT | Performed by: EMERGENCY MEDICINE

## 2022-05-11 RX ORDER — HYDROMORPHONE HCL IN WATER/PF 6 MG/30 ML
0.2 PATIENT CONTROLLED ANALGESIA SYRINGE INTRAVENOUS EVERY 4 HOURS PRN
Status: DISCONTINUED | OUTPATIENT
Start: 2022-05-11 | End: 2022-05-12 | Stop reason: HOSPADM

## 2022-05-11 RX ORDER — LABETALOL HYDROCHLORIDE 5 MG/ML
10 INJECTION, SOLUTION INTRAVENOUS EVERY 4 HOURS PRN
Status: DISCONTINUED | OUTPATIENT
Start: 2022-05-11 | End: 2022-05-12 | Stop reason: HOSPADM

## 2022-05-11 RX ORDER — HYDROMORPHONE HCL/PF 1 MG/ML
0.5 SYRINGE (ML) INJECTION ONCE
Status: COMPLETED | OUTPATIENT
Start: 2022-05-11 | End: 2022-05-11

## 2022-05-11 RX ORDER — SODIUM CHLORIDE, SODIUM LACTATE, POTASSIUM CHLORIDE, CALCIUM CHLORIDE 600; 310; 30; 20 MG/100ML; MG/100ML; MG/100ML; MG/100ML
125 INJECTION, SOLUTION INTRAVENOUS CONTINUOUS
Status: DISCONTINUED | OUTPATIENT
Start: 2022-05-11 | End: 2022-05-12

## 2022-05-11 RX ORDER — HYDROMORPHONE HCL/PF 1 MG/ML
0.5 SYRINGE (ML) INJECTION EVERY 4 HOURS PRN
Status: DISCONTINUED | OUTPATIENT
Start: 2022-05-11 | End: 2022-05-12

## 2022-05-11 RX ORDER — HEPARIN SODIUM 5000 [USP'U]/ML
5000 INJECTION, SOLUTION INTRAVENOUS; SUBCUTANEOUS EVERY 8 HOURS SCHEDULED
Status: DISCONTINUED | OUTPATIENT
Start: 2022-05-11 | End: 2022-05-12 | Stop reason: HOSPADM

## 2022-05-11 RX ORDER — ONDANSETRON 2 MG/ML
1 INJECTION INTRAMUSCULAR; INTRAVENOUS ONCE
Status: COMPLETED | OUTPATIENT
Start: 2022-05-11 | End: 2022-05-11

## 2022-05-11 RX ORDER — ONDANSETRON 2 MG/ML
4 INJECTION INTRAMUSCULAR; INTRAVENOUS EVERY 6 HOURS PRN
Status: DISCONTINUED | OUTPATIENT
Start: 2022-05-11 | End: 2022-05-12 | Stop reason: HOSPADM

## 2022-05-11 RX ORDER — XYLITOL/YERBA SANTA
5 AEROSOL, SPRAY WITH PUMP (ML) MUCOUS MEMBRANE 4 TIMES DAILY PRN
Status: DISCONTINUED | OUTPATIENT
Start: 2022-05-11 | End: 2022-05-12 | Stop reason: HOSPADM

## 2022-05-11 RX ADMIN — HYDROMORPHONE HYDROCHLORIDE 0.5 MG: 1 INJECTION, SOLUTION INTRAMUSCULAR; INTRAVENOUS; SUBCUTANEOUS at 06:01

## 2022-05-11 RX ADMIN — SODIUM CHLORIDE, SODIUM LACTATE, POTASSIUM CHLORIDE, AND CALCIUM CHLORIDE 125 ML/HR: .6; .31; .03; .02 INJECTION, SOLUTION INTRAVENOUS at 06:19

## 2022-05-11 RX ADMIN — HEPARIN SODIUM 5000 UNITS: 5000 INJECTION INTRAVENOUS; SUBCUTANEOUS at 21:19

## 2022-05-11 RX ADMIN — HEPARIN SODIUM 5000 UNITS: 5000 INJECTION INTRAVENOUS; SUBCUTANEOUS at 13:37

## 2022-05-11 RX ADMIN — SODIUM CHLORIDE 500 ML: 0.9 INJECTION, SOLUTION INTRAVENOUS at 02:30

## 2022-05-11 RX ADMIN — HYDROMORPHONE HYDROCHLORIDE 0.5 MG: 1 INJECTION, SOLUTION INTRAMUSCULAR; INTRAVENOUS; SUBCUTANEOUS at 02:36

## 2022-05-11 RX ADMIN — HYDROMORPHONE HYDROCHLORIDE 0.2 MG: 0.2 INJECTION, SOLUTION INTRAMUSCULAR; INTRAVENOUS; SUBCUTANEOUS at 12:42

## 2022-05-11 RX ADMIN — HEPARIN SODIUM 5000 UNITS: 5000 INJECTION INTRAVENOUS; SUBCUTANEOUS at 06:29

## 2022-05-11 RX ADMIN — Medication 1 SPRAY: at 08:11

## 2022-05-11 NOTE — ED NOTES
2nd 450 ml bottle of PO contrast given to pt at this time  Chuck Elizabeth, RN  05/11/22 [facility] Shobha, RN  05/11/22 5410

## 2022-05-11 NOTE — QUICK NOTE
Patient seen for adjustment of nasogastric tube    CXR obtained 5/11 showing NGT coiled in stomach, withdrawn 10 cm uneventfully, re-secured 2 nose, maintained to low continuous suction       -continue NPO/NGT  -low continuous suction  -flush main port with saline Q shift due to thick secretions  -flush blue port with air as needed to maintain sumping and patency

## 2022-05-11 NOTE — H&P
H&P Exam - General Gómez [de-identified] y o  female MRN: [id number]    Unit/Bed#: ED 10 Encounter: [id number]    Assessment:  General Gómez is a [de-identified] y o  female with a PMHx total colectomy and end ileostomy and subsequent reversal with ileorectal anastomosis in 2005 for C diff colitis, and prior SBO in 2017 medically managed, who presents with SBO    Afebrile, abdomen tender with focal guarding left lower quadrant  WBC 11 4  CT:  SBO with transition point anterior mid right abdomen    Plan:  Colorectal admission  NPO  NG tube  - follow-up chest x-ray for placement given resistance with insertion and minimal output  IV fluids  DVT prophylaxis  Strict I's and o's  Pain and nausea control p r n  History of Present Illness   General Gómez this 28-year-old female with a past medical history total colectomy and end ileostomy with subsequent reversal and ileorectal anastomosis in 2005 for C diff colitis as well as prior SBO in 2017 that was medically managed currently presents with 1 day history of nausea vomiting and abdominal pain  Patient states the pain began around 2:00 p m  and was tolerating diet prior to this time  She stated she has been having multiple episodes of emesis since the pain began  Also endorses some abdominal distention/bloating  Denies hematemesis  Last bowel movement prior to pain  Endorses flatus  Denies fever or chills  Abdomen is distended with left lower quadrant focal guarding and tenderness  CT scan revealing multiple loops of abnormally dilated proximal to mid small bowel with relative collapse distally consistent with small-bowel obstruction, transition point in the anterior mid right abdomen  Will place NG tube, NPO IV fluids and admit colorectal service  Review of Systems   Constitutional: Negative  Negative for chills and fever  HENT: Negative  Eyes: Negative  Respiratory: Negative  Cardiovascular: Negative      Gastrointestinal: Positive for abdominal distention, abdominal pain, nausea and vomiting  Endocrine: Negative  Genitourinary: Negative  Musculoskeletal: Negative  Skin: Negative  Allergic/Immunologic: Negative  Neurological: Negative  Hematological: Negative  Psychiatric/Behavioral: Negative  All other systems reviewed and are negative  Historical Information   Past Medical History:   Diagnosis Date    Disease of thyroid gland     Hyperlipidemia     Hypertension      Past Surgical History:   Procedure Laterality Date    BREAST EXCISIONAL BIOPSY Left 1989    benign, guessing year    COLON SURGERY      OTHER SURGICAL HISTORY      THYROID SURGERY       Social History   Social History     Substance and Sexual Activity   Alcohol Use Yes    Alcohol/week: 4 0 - 6 0 standard drinks    Types: 2 - 3 Glasses of wine, 2 - 3 Cans of beer per week    Comment: social     Social History     Substance and Sexual Activity   Drug Use No     Social History     Tobacco Use   Smoking Status Never Smoker   Smokeless Tobacco Never Used        E-Cigarette/Vaping Substances       Family History:   Family History   Problem Relation Age of Onset    Stroke Mother     Prostate cancer Father 80    No Known Problems Sister     No Known Problems Daughter     No Known Problems Maternal Grandmother     No Known Problems Maternal Grandfather     No Known Problems Paternal Grandmother     No Known Problems Paternal Grandfather     Breast cancer Maternal Aunt         guessing 53-48    No Known Problems Maternal Aunt     No Known Problems Maternal Aunt     No Known Problems Maternal Aunt     No Known Problems Maternal Aunt     No Known Problems Paternal Aunt     No Known Problems Paternal Aunt        Meds/Allergies   PTA meds:   Prior to Admission Medications   Prescriptions Last Dose Informant Patient Reported? Taking? Cyanocobalamin (VITAMIN B 12 PO)  Self Yes No   Sig: Take 500 mg by mouth daily     DAILY MULTIPLE VITAMINS PO  Self Yes No   Sig: Take 1 tablet by mouth daily   DULoxetine (CYMBALTA) 20 mg capsule  Self Yes No   Sig: Take 20 mg by mouth daily   Multiple Vitamin (MULTIVITAMIN) tablet  Self Yes No   Sig: Take 1 tablet by mouth daily  Omega-3 Fatty Acids (OMEGA-3 FISH OIL) 1200 MG CAPS  Self Yes No   Sig: Take 2 capsules by mouth daily  Vitamin D, Cholecalciferol, 1000 UNITS CAPS  Self Yes No   Sig: Take 1 tablet by mouth daily  amLODIPine (NORVASC) 5 mg tablet   Yes No   Sig: Take 5 mg by mouth daily  aspirin 81 MG tablet  Self Yes No   Sig: Take 81 mg by mouth daily  calcium-vitamin D (OSCAL) 250-125 MG-UNIT per tablet  Self Yes No   Sig: Take 1 tablet by mouth daily  gabapentin (NEURONTIN) 100 mg capsule  Self Yes No   Sig: Take by mouth daily   loperamide (IMODIUM) 2 mg capsule  Self Yes No   Sig: Take 2 mg by mouth 4 (four) times a day as needed for diarrhea   traZODone (DESYREL) 150 mg tablet  Self Yes No   Si/2 TAB AT BEDTIME ORALLY 30 DAYS      Facility-Administered Medications: None     Allergies   Allergen Reactions    Amoxicillin     Clindamycin/Lincomycin     Gentamycin [Gentamicin]        Objective   First Vitals:   Blood Pressure: (!) 172/79 (22)  Pulse: 63 (22)  Temperature: (!) 97 4 °F (36 3 °C) (22)  Respirations: 18 (22)  SpO2: 97 % (22)    Current Vitals:   Blood Pressure: 154/78 (22)  Pulse: 74 (22)  Temperature: (!) 97 4 °F (36 3 °C) (22)  Respirations: 20 (22)  SpO2: 92 % (22)    No intake or output data in the 24 hours ending 22    Invasive Devices  Report    Peripheral Intravenous Line  Duration           Peripheral IV 22 Left Antecubital <1 day          Drain  Duration           NG/OG/Enteral Tube Nasogastric 14 Fr Right nare <1 day                Physical Exam  Vitals reviewed  Constitutional:       Appearance: She is normal weight  She is not toxic-appearing or diaphoretic  HENT:      Head: Normocephalic and atraumatic  Right Ear: External ear normal       Left Ear: External ear normal       Nose: Nose normal       Mouth/Throat:      Mouth: Mucous membranes are moist       Pharynx: Oropharynx is clear  Eyes:      Extraocular Movements: Extraocular movements intact  Conjunctiva/sclera: Conjunctivae normal    Cardiovascular:      Rate and Rhythm: Normal rate  Pulmonary:      Effort: Pulmonary effort is normal  No respiratory distress  Abdominal:      General: There is distension  Tenderness: There is abdominal tenderness  There is guarding (focal guarding LLQ)  There is no rebound  Musculoskeletal:         General: Normal range of motion  Cervical back: Normal range of motion  Skin:     General: Skin is warm and dry  Neurological:      General: No focal deficit present  Mental Status: She is alert  Cranial Nerves: No cranial nerve deficit  Motor: No weakness  Psychiatric:         Mood and Affect: Mood normal          Thought Content:  Thought content normal          Lab Results:   Results Reviewed     Procedure Component Value Units Date/Time    CBC and differential [[id number]]  (Abnormal) Collected: 05/11/22 0230    Lab Status: Final result Specimen: Blood from Arm, Left Updated: 05/11/22 0320     WBC 11 42 Thousand/uL      RBC 4 71 Million/uL      Hemoglobin 13 9 g/dL      Hematocrit 43 2 %      MCV 92 fL      MCH 29 5 pg      MCHC 32 2 g/dL      RDW 13 5 %      MPV 10 6 fL      Platelets 186 Thousands/uL      nRBC 0 /100 WBCs      Neutrophils Relative 92 %      Immat GRANS % 0 %      Lymphocytes Relative 5 %      Monocytes Relative 3 %      Eosinophils Relative 0 %      Basophils Relative 0 %      Neutrophils Absolute 10 44 Thousands/µL      Immature Grans Absolute 0 03 Thousand/uL      Lymphocytes Absolute 0 57 Thousands/µL      Monocytes Absolute 0 34 Thousand/µL      Eosinophils Absolute 0 01 Thousand/µL      Basophils Absolute 0 03 Thousands/µL     Narrative: This is an appended report  These results have been appended to a previously verified report  Comprehensive metabolic panel [[id number]]  (Abnormal) Collected: 05/11/22 0230    Lab Status: Final result Specimen: Blood from Arm, Left Updated: 05/11/22 0256     Sodium 135 mmol/L      Potassium 4 1 mmol/L      Chloride 100 mmol/L      CO2 31 mmol/L      ANION GAP 4 mmol/L      BUN 25 mg/dL      Creatinine 0 98 mg/dL      Glucose 146 mg/dL      Calcium 9 5 mg/dL      AST 56 U/L      ALT 38 U/L      Alkaline Phosphatase 46 U/L      Total Protein 7 3 g/dL      Albumin 3 7 g/dL      Total Bilirubin 0 80 mg/dL      eGFR 54 ml/min/1 73sq m     Narrative:      [place] guidelines for Chronic Kidney Disease (CKD):     Stage 1 with normal or high GFR (GFR > 90 mL/min/1 73 square meters)    Stage 2 Mild CKD (GFR = 60-89 mL/min/1 73 square meters)    Stage 3A Moderate CKD (GFR = 45-59 mL/min/1 73 square meters)    Stage 3B Moderate CKD (GFR = 30-44 mL/min/1 73 square meters)    Stage 4 Severe CKD (GFR = 15-29 mL/min/1 73 square meters)    Stage 5 End Stage CKD (GFR <15 mL/min/1 73 square meters)  Note: GFR calculation is accurate only with a steady state creatinine    Lipase [[id number]]  (Normal) Collected: 05/11/22 0230    Lab Status: Final result Specimen: Blood from Arm, Left Updated: 05/11/22 0256     Lipase 143 u/L               Imaging:  Findings compatible with small-bowel obstruction  Surgical consult recommended  Trace intra-abdominal ascites  No free intraperitoneal air  Moderate to large sized sliding hiatal hernia  EKG, Pathology, and Other Studies:  Na    Code Status: Level 1 - Full Code  Advance Directive and Living Will:      Power of :    POLST:      Counseling / Coordination of Care:   Greater than 50% of total time was spent with the patient and / or family counseling and / or coordination of care

## 2022-05-11 NOTE — QUICK NOTE
Colorectal Surgery Quick Note:    CXR reviewed  NGT looped in stomach  Pulled back approx  10cm  350cc of oral contrast suctioned         Plan:  Continue NGT to low continuous suction  Monitor output and character  Mouth kote and chloraseptic sprays PRN for comfort  Pain and nausea control PRN

## 2022-05-11 NOTE — ED ATTENDING ATTESTATION
5/11/2022  IAdia MD, saw and evaluated the patient  I have discussed the patient with the resident/non-physician practitioner and agree with the resident's/non-physician practitioner's findings, Plan of Care, and MDM as documented in the resident's/non-physician practitioner's note, except where noted  All available labs and Radiology studies were reviewed  I was present for key portions of any procedure(s) performed by the resident/non-physician practitioner and I was immediately available to provide assistance  At this point I agree with the current assessment done in the Emergency Department  I have conducted an independent evaluation of this patient a history and physical is as follows:    ED Course      Emergency Department Note- Dixon Mata [de-identified] y o  female MRN: [id number]    Unit/Bed#: ED 10 Encounter: [id number]    Dixon Mata is a [de-identified] y o  female who presents with   Chief Complaint   Patient presents with    Vomiting     Pt presents by EMS for central abdominal pain since 1 pm today; vomiting x16-17 since 8 pm  Denies CP, SOB  C/o some weakness and dizziness         History of Present Illness   HPI:  Dixon Mata is a [de-identified] y o  female who presents for evaluation of:  Mid abdominal pain that started in the afternoon, got worse, and now has resolved  Patient has h/o colectomy years ago  Patient notes associated N/V  Patient noted onset of the abdominal pain at 1300 yesterday afternoon; abdominal pain was more severe earlier and is improved currently  Patient has had associated N/V; no blood in vomitus  Patient denies associated diarrhea  Patient has h/o colectomy with ileostomy reversal      Review of Systems   Constitutional: Positive for appetite change and chills  Negative for fever  HENT: Negative for congestion and rhinorrhea  Respiratory: Negative for cough and shortness of breath  Cardiovascular: Negative for chest pain and palpitations     Gastrointestinal: Positive for abdominal pain, nausea and vomiting  Negative for diarrhea  Neurological: Positive for light-headedness  Negative for headaches  All other systems reviewed and are negative  Historical Information   Past Medical History:   Diagnosis Date    Disease of thyroid gland     Hyperlipidemia     Hypertension      Past Surgical History:   Procedure Laterality Date    BREAST EXCISIONAL BIOPSY Left 1989    benign, guessing year    COLON SURGERY      OTHER SURGICAL HISTORY      THYROID SURGERY       Social History   Social History     Substance and Sexual Activity   Alcohol Use Yes    Alcohol/week: 4 0 - 6 0 standard drinks    Types: 2 - 3 Glasses of wine, 2 - 3 Cans of beer per week    Comment: social     Social History     Substance and Sexual Activity   Drug Use No     Social History     Tobacco Use   Smoking Status Never Smoker   Smokeless Tobacco Never Used     Family History:   Family History   Problem Relation Age of Onset    Stroke Mother     Prostate cancer Father 80    No Known Problems Sister     No Known Problems Daughter     No Known Problems Maternal Grandmother     No Known Problems Maternal Grandfather     No Known Problems Paternal Grandmother     No Known Problems Paternal Grandfather     Breast cancer Maternal Aunt         guessing 53-48    No Known Problems Maternal Aunt     No Known Problems Maternal Aunt     No Known Problems Maternal Aunt     No Known Problems Maternal Aunt     No Known Problems Paternal Aunt     No Known Problems Paternal Aunt        Meds/Allergies   PTA meds:   Prior to Admission Medications   Prescriptions Last Dose Informant Patient Reported? Taking? Cyanocobalamin (VITAMIN B 12 PO)  Self Yes No   Sig: Take 500 mg by mouth daily     DAILY MULTIPLE VITAMINS PO  Self Yes No   Sig: Take 1 tablet by mouth daily   DULoxetine (CYMBALTA) 20 mg capsule  Self Yes No   Sig: Take 20 mg by mouth daily   Multiple Vitamin (MULTIVITAMIN) tablet  Self Yes No Sig: Take 1 tablet by mouth daily  Omega-3 Fatty Acids (OMEGA-3 FISH OIL) 1200 MG CAPS  Self Yes No   Sig: Take 2 capsules by mouth daily  Vitamin D, Cholecalciferol, 1000 UNITS CAPS  Self Yes No   Sig: Take 1 tablet by mouth daily  amLODIPine (NORVASC) 5 mg tablet   Yes No   Sig: Take 5 mg by mouth daily  aspirin 81 MG tablet  Self Yes No   Sig: Take 81 mg by mouth daily  calcium-vitamin D (OSCAL) 250-125 MG-UNIT per tablet  Self Yes No   Sig: Take 1 tablet by mouth daily  gabapentin (NEURONTIN) 100 mg capsule  Self Yes No   Sig: Take by mouth daily   loperamide (IMODIUM) 2 mg capsule  Self Yes No   Sig: Take 2 mg by mouth 4 (four) times a day as needed for diarrhea   traZODone (DESYREL) 150 mg tablet  Self Yes No   Si/2 TAB AT BEDTIME ORALLY 30 DAYS      Facility-Administered Medications: None     Allergies   Allergen Reactions    Amoxicillin     Clindamycin/Lincomycin     Gentamycin [Gentamicin]        Objective   First Vitals:   Blood Pressure: (!) 172/79 (22)  Pulse: 63 (22)  Temperature: (!) 97 4 °F (36 3 °C) (22)  Respirations: 18 (22)  SpO2: 97 % (22)    Current Vitals:   Blood Pressure: (!) 172/79 (227)  Pulse: 63 (22)  Temperature: (!) 97 4 °F (36 3 °C) (22)  Respirations: 18 (22)  SpO2: 97 % (22)    No intake or output data in the 24 hours ending 22 0222    Invasive Devices  Report    Peripheral Intravenous Line  Duration           Peripheral IV 22 Left Antecubital <1 day                Physical Exam  Vitals and nursing note reviewed  Constitutional:       General: She is not in acute distress  Appearance: Normal appearance  She is well-developed  HENT:      Head: Normocephalic and atraumatic  Right Ear: External ear normal       Left Ear: External ear normal       Nose: Nose normal       Mouth/Throat:      Pharynx: No oropharyngeal exudate     Eyes: Conjunctiva/sclera: Conjunctivae normal       Pupils: Pupils are equal, round, and reactive to light  Cardiovascular:      Rate and Rhythm: Normal rate and regular rhythm  Pulmonary:      Effort: Pulmonary effort is normal  No respiratory distress  Abdominal:      General: Abdomen is flat  There is no distension  Palpations: Abdomen is soft  Musculoskeletal:         General: No deformity  Normal range of motion  Cervical back: Normal range of motion and neck supple  Skin:     General: Skin is warm and dry  Capillary Refill: Capillary refill takes less than 2 seconds  Neurological:      General: No focal deficit present  Mental Status: She is alert and oriented to person, place, and time  Mental status is at baseline  Coordination: Coordination normal    Psychiatric:         Mood and Affect: Mood normal          Behavior: Behavior normal          Thought Content: Thought content normal          Judgment: Judgment normal            Medical Decision Makin  Abdominal pain: CTAP; pain control and antiemetics  ; CBC; CMP    No results found for this or any previous visit (from the past 36 hour(s))  No orders to display         Portions of the record may have been created with voice recognition software  Occasional wrong word or "sound a like" substitutions may have occurred due to the inherent limitations of voice recognition software  Read the chart carefully and recognize, using context, where substitutions have occurred            Critical Care Time  Procedures

## 2022-05-11 NOTE — ED NOTES
Nurses at bedside; NG placed in R  Nare, pt tolerated well  Limited return and hit resistance to further advance NG tube  Xray needed due to not being able to auscultate the NG tube once air was pushed through  Awaiting results of CXR at this time        Aileen Mancia RN  05/11/22 9819

## 2022-05-12 VITALS
DIASTOLIC BLOOD PRESSURE: 93 MMHG | BODY MASS INDEX: 22.07 KG/M2 | HEART RATE: 87 BPM | SYSTOLIC BLOOD PRESSURE: 155 MMHG | HEIGHT: 62 IN | RESPIRATION RATE: 16 BRPM | WEIGHT: 119.93 LBS | OXYGEN SATURATION: 94 % | TEMPERATURE: 98.1 F

## 2022-05-12 LAB
ANION GAP SERPL CALCULATED.3IONS-SCNC: 5 MMOL/L (ref 4–13)
BASOPHILS # BLD AUTO: 0.05 THOUSANDS/ΜL (ref 0–0.1)
BASOPHILS NFR BLD AUTO: 0 % (ref 0–1)
BUN SERPL-MCNC: 14 MG/DL (ref 5–25)
CALCIUM SERPL-MCNC: 9.3 MG/DL (ref 8.3–10.1)
CHLORIDE SERPL-SCNC: 107 MMOL/L (ref 100–108)
CO2 SERPL-SCNC: 27 MMOL/L (ref 21–32)
CREAT SERPL-MCNC: 0.8 MG/DL (ref 0.6–1.3)
EOSINOPHIL # BLD AUTO: 0.1 THOUSAND/ΜL (ref 0–0.61)
EOSINOPHIL NFR BLD AUTO: 1 % (ref 0–6)
ERYTHROCYTE [DISTWIDTH] IN BLOOD BY AUTOMATED COUNT: 13.7 % (ref 11.6–15.1)
GFR SERPL CREATININE-BSD FRML MDRD: 69 ML/MIN/1.73SQ M
GLUCOSE SERPL-MCNC: 116 MG/DL (ref 65–140)
HCT VFR BLD AUTO: 46 % (ref 34.8–46.1)
HGB BLD-MCNC: 14.6 G/DL (ref 11.5–15.4)
IMM GRANULOCYTES # BLD AUTO: 0.04 THOUSAND/UL (ref 0–0.2)
IMM GRANULOCYTES NFR BLD AUTO: 0 % (ref 0–2)
LYMPHOCYTES # BLD AUTO: 1.71 THOUSANDS/ΜL (ref 0.6–4.47)
LYMPHOCYTES NFR BLD AUTO: 14 % (ref 14–44)
MAGNESIUM SERPL-MCNC: 2.1 MG/DL (ref 1.6–2.6)
MCH RBC QN AUTO: 29.3 PG (ref 26.8–34.3)
MCHC RBC AUTO-ENTMCNC: 31.7 G/DL (ref 31.4–37.4)
MCV RBC AUTO: 92 FL (ref 82–98)
MONOCYTES # BLD AUTO: 1 THOUSAND/ΜL (ref 0.17–1.22)
MONOCYTES NFR BLD AUTO: 8 % (ref 4–12)
NEUTROPHILS # BLD AUTO: 9.18 THOUSANDS/ΜL (ref 1.85–7.62)
NEUTS SEG NFR BLD AUTO: 77 % (ref 43–75)
NRBC BLD AUTO-RTO: 0 /100 WBCS
PHOSPHATE SERPL-MCNC: 2.2 MG/DL (ref 2.3–4.1)
PLATELET # BLD AUTO: 311 THOUSANDS/UL (ref 149–390)
PMV BLD AUTO: 9.8 FL (ref 8.9–12.7)
POTASSIUM SERPL-SCNC: 3.8 MMOL/L (ref 3.5–5.3)
RBC # BLD AUTO: 4.98 MILLION/UL (ref 3.81–5.12)
SODIUM SERPL-SCNC: 139 MMOL/L (ref 136–145)
WBC # BLD AUTO: 12.08 THOUSAND/UL (ref 4.31–10.16)

## 2022-05-12 PROCEDURE — 83735 ASSAY OF MAGNESIUM: CPT | Performed by: STUDENT IN AN ORGANIZED HEALTH CARE EDUCATION/TRAINING PROGRAM

## 2022-05-12 PROCEDURE — 84100 ASSAY OF PHOSPHORUS: CPT | Performed by: STUDENT IN AN ORGANIZED HEALTH CARE EDUCATION/TRAINING PROGRAM

## 2022-05-12 PROCEDURE — 80048 BASIC METABOLIC PNL TOTAL CA: CPT | Performed by: STUDENT IN AN ORGANIZED HEALTH CARE EDUCATION/TRAINING PROGRAM

## 2022-05-12 PROCEDURE — 99239 HOSP IP/OBS DSCHRG MGMT >30: CPT | Performed by: COLON & RECTAL SURGERY

## 2022-05-12 PROCEDURE — 85025 COMPLETE CBC W/AUTO DIFF WBC: CPT | Performed by: STUDENT IN AN ORGANIZED HEALTH CARE EDUCATION/TRAINING PROGRAM

## 2022-05-12 RX ORDER — ASPIRIN 81 MG/1
81 TABLET ORAL DAILY
Status: DISCONTINUED | OUTPATIENT
Start: 2022-05-12 | End: 2022-05-12 | Stop reason: HOSPADM

## 2022-05-12 RX ORDER — POTASSIUM CHLORIDE 20 MEQ/1
20 TABLET, EXTENDED RELEASE ORAL ONCE
Status: COMPLETED | OUTPATIENT
Start: 2022-05-12 | End: 2022-05-12

## 2022-05-12 RX ORDER — ACETAMINOPHEN 325 MG/1
650 TABLET ORAL EVERY 6 HOURS PRN
Status: DISCONTINUED | OUTPATIENT
Start: 2022-05-12 | End: 2022-05-12 | Stop reason: HOSPADM

## 2022-05-12 RX ORDER — DULOXETIN HYDROCHLORIDE 20 MG/1
20 CAPSULE, DELAYED RELEASE ORAL DAILY
Status: DISCONTINUED | OUTPATIENT
Start: 2022-05-12 | End: 2022-05-12 | Stop reason: HOSPADM

## 2022-05-12 RX ORDER — AMLODIPINE BESYLATE 5 MG/1
5 TABLET ORAL DAILY
Status: DISCONTINUED | OUTPATIENT
Start: 2022-05-12 | End: 2022-05-12 | Stop reason: HOSPADM

## 2022-05-12 RX ORDER — DEXTROSE, SODIUM CHLORIDE, AND POTASSIUM CHLORIDE 5; .45; .15 G/100ML; G/100ML; G/100ML
50 INJECTION INTRAVENOUS CONTINUOUS
Status: DISCONTINUED | OUTPATIENT
Start: 2022-05-12 | End: 2022-05-12

## 2022-05-12 RX ADMIN — AMLODIPINE BESYLATE 5 MG: 5 TABLET ORAL at 10:03

## 2022-05-12 RX ADMIN — DEXTROSE, SODIUM CHLORIDE, AND POTASSIUM CHLORIDE 50 ML/HR: 5; .45; .15 INJECTION INTRAVENOUS at 08:21

## 2022-05-12 RX ADMIN — HEPARIN SODIUM 5000 UNITS: 5000 INJECTION INTRAVENOUS; SUBCUTANEOUS at 06:26

## 2022-05-12 RX ADMIN — POTASSIUM CHLORIDE 20 MEQ: 1500 TABLET, EXTENDED RELEASE ORAL at 08:33

## 2022-05-12 RX ADMIN — ASPIRIN 81 MG: 81 TABLET, COATED ORAL at 10:03

## 2022-05-12 RX ADMIN — DULOXETINE 20 MG: 20 CAPSULE, DELAYED RELEASE ORAL at 10:03

## 2022-05-12 RX ADMIN — SODIUM CHLORIDE, SODIUM LACTATE, POTASSIUM CHLORIDE, AND CALCIUM CHLORIDE 125 ML/HR: .6; .31; .03; .02 INJECTION, SOLUTION INTRAVENOUS at 00:20

## 2022-05-12 NOTE — PROGRESS NOTES
Progress Note - Colorectal Surgery   Kassandra Villanueva [de-identified] y o  female MRN: [id number]  Unit/Bed#: -01 Encounter: [id number]    Assessment:  Kassandra Villanueva is a [de-identified] y o  female with a PMHx total colectomy and end ileostomy and subsequent reversal with ileorectal anastomosis in 2005 for C diff colitis, and prior SBO in 2017 medically managed, who presents with SBO  S/p NGT placement 5/11    Afebrile, VSS on RA  PE: mild tender RLQ, soft  WBC pending    300 cc NGT output day shift, night shift not record output  Stool x1 recorded, patient says 2 bowel movements    Plan:  NPO/ NGT- consider clamp trial and removal  IV fluids  DVT prophylaxis  Strict I's and o's  Pain and nausea control p r n  Subjective/Objective     Subjective:   No acute events overnight  BM x2  -N  -V  Throat pain with NGT  Objective:     Blood pressure 169/92, pulse 79, temperature 98 3 °F (36 8 °C), resp  rate 18, height 5' 2" (1 575 m), weight 54 4 kg (120 lb), SpO2 91 %  ,Body mass index is 21 95 kg/m²        Intake/Output Summary (Last 24 hours) at 5/11/2022 2203  Last data filed at 5/11/2022 1341  Gross per 24 hour   Intake --   Output 300 ml   Net -300 ml       Invasive Devices  Report    Peripheral Intravenous Line  Duration           Peripheral IV 05/11/22 Left Antecubital <1 day          Drain  Duration           NG/OG/Enteral Tube Nasogastric 14 Fr Right nare <1 day                Physical Exam:   Gen: NAD, Comfortable  Neuro: A&O, No focal deficits  Head: Normal Cephalic, Atraumatic  Eye: EOMI, PERRLA, No scleral icterus  Neck: Supple, No JVD, Midline trachea  CV: RRR, Cap refill <2 sec  Pulm: Normal work of breathing, no respiratory distress  Abd: Soft, Non-Distended, Non-Tender  Ext: No edema, Non-tender  Skin: warm, dry, intact

## 2022-05-23 NOTE — PHYSICIAN ADVISOR
Current patient class: Inpatient  The patient is currently on Hospital Day: 2 at [address] [place] [address]      The patient was admitted to the hospital at  7:15 AM on 5/11/22 for the following diagnosis:  SBO (small bowel obstruction) ([facility] [address] ) [K56 609]  Abdominal pain [L77 0]  Periumbilical abdominal pain [R10 33]  Nausea and vomiting, unspecified vomiting type [R11 2]       There was documentation in the medical record of an expected length of stay of at least 2 midnights  The patient was therefore expected to satisfy the 2 midnight benchmark and given the 2 midnight presumption was appropriate for INPATIENT ADMISSION  Given this expectation of a satisfying stay, CMS instructs us that the patient is most often appropriate for inpatient admission under part A provided medical necessity is documented in the chart  After review of the relevant documentation, labs, vital signs and test results, the patient is appropriate for INPATIENT ADMISSION  Admission to the hospital as an inpatient is a complex decision making process which requires the practitioner to consider the patients presenting complaint, history and physical examination and all relevant testing  With this in mind, in this case, the patient was deemed appropriate for INPATIENT ADMISSION  After review of the documentation and testing available at the time of the admission, I concur with this clinical determination of medical necessity  For the reason noted, the patient was discharged before reaching 2 midnights as an inpatient  Unexpected early recovery  Rationale is as follows: The patient is a [de-identified] yrs old Female who presented to the ED at 5/11/2022  2:00 AM with a chief complaint of Vomiting (Pt presents by EMS for central abdominal pain since 1 pm today; vomiting x16-17 since 8 pm  Denies CP, SOB  C/o some weakness and dizziness)  Patient admitted IP with SBO on ct scan   She has PMH of total colectomy and end ileostomy with subsequent reversal and ileorectal anastomosis in 2005 for C diff colitis as well as prior SBO in 2017  NGT was placed upon admission  Se had <500cc output and this was able to be removed  She was able to tolerate liquids then diet advanced by time of discharge  Given complexity of her history of ct findings, IP seems appropriate on admission with unexpected early recovery  The patients vitals on arrival were   ED Triage Vitals   Temperature Pulse Respirations Blood Pressure SpO2   05/11/22 0207 05/11/22 0207 05/11/22 0207 05/11/22 0207 05/11/22 0207   (!) 97 4 °F (36 3 °C) 63 18 (!) 172/79 97 %      Temp Source Heart Rate Source Patient Position - Orthostatic VS BP Location FiO2 (%)   05/11/22 0926 -- 05/11/22 0926 05/11/22 0926 --   Oral  Lying Right arm       Pain Score       05/11/22 0207       No Pain           Past Medical History:   Diagnosis Date    Disease of thyroid gland     Hyperlipidemia     Hypertension      Past Surgical History:   Procedure Laterality Date    BREAST EXCISIONAL BIOPSY Left 1989    benign, guessing year    COLON SURGERY      OTHER SURGICAL HISTORY      THYROID SURGERY             Consults have been placed to:   IP CONSULT TO COLORECTAL SURGERY    Vitals:    05/11/22 2204 05/12/22 0537 05/12/22 0735 05/12/22 1002   BP: 169/89  163/83 155/93   BP Location:       Pulse: 73  68 87   Resp: 16  16    Temp: 99 6 °F (37 6 °C)  98 1 °F (36 7 °C)    TempSrc:       SpO2: 91%  90% 94%   Weight:  54 4 kg (119 lb 14 9 oz)     Height:           Most recent labs:    No results for input(s): WBC, HGB, HCT, PLT, K, NA, CALCIUM, BUN, CREATININE, LIPASE, AMYLASE, INR, TROPONINI, CKTOTAL, AST, ALT, ALKPHOS, BILITOT in the last 72 hours  Scheduled Meds:  Continuous Infusions:No current facility-administered medications for this encounter  PRN Meds:      Surgical procedures (if appropriate):

## 2022-06-05 ENCOUNTER — HOSPITAL ENCOUNTER (EMERGENCY)
Facility: HOSPITAL | Age: 82
Discharge: HOME | End: 2022-06-06
Attending: EMERGENCY MEDICINE
Payer: MEDICARE

## 2022-06-05 ENCOUNTER — APPOINTMENT (EMERGENCY)
Dept: RADIOLOGY | Facility: HOSPITAL | Age: 82
End: 2022-06-05
Payer: MEDICARE

## 2022-06-05 DIAGNOSIS — R11.2 NAUSEA AND VOMITING: Primary | ICD-10-CM

## 2022-06-05 DIAGNOSIS — Z87.19 HX SBO: ICD-10-CM

## 2022-06-05 PROCEDURE — 36415 COLL VENOUS BLD VENIPUNCTURE: CPT | Performed by: EMERGENCY MEDICINE

## 2022-06-05 PROCEDURE — 85025 COMPLETE CBC W/AUTO DIFF WBC: CPT | Performed by: EMERGENCY MEDICINE

## 2022-06-05 PROCEDURE — 83690 ASSAY OF LIPASE: CPT | Performed by: EMERGENCY MEDICINE

## 2022-06-05 PROCEDURE — G1004 CDSM NDSC: HCPCS

## 2022-06-05 PROCEDURE — 80053 COMPREHEN METABOLIC PANEL: CPT | Performed by: EMERGENCY MEDICINE

## 2022-06-05 PROCEDURE — 99284 EMERGENCY DEPT VISIT MOD MDM: CPT

## 2022-06-05 PROCEDURE — 96360 HYDRATION IV INFUSION INIT: CPT

## 2022-06-05 PROCEDURE — 84484 ASSAY OF TROPONIN QUANT: CPT | Performed by: EMERGENCY MEDICINE

## 2022-06-05 PROCEDURE — 99285 EMERGENCY DEPT VISIT HI MDM: CPT | Performed by: EMERGENCY MEDICINE

## 2022-06-05 PROCEDURE — 74176 CT ABD & PELVIS W/O CONTRAST: CPT

## 2022-06-05 PROCEDURE — 93005 ELECTROCARDIOGRAM TRACING: CPT

## 2022-06-05 RX ORDER — ONDANSETRON 2 MG/ML
4 INJECTION INTRAMUSCULAR; INTRAVENOUS ONCE
Status: DISCONTINUED | OUTPATIENT
Start: 2022-06-05 | End: 2022-06-06

## 2022-06-05 RX ADMIN — SODIUM CHLORIDE 1000 ML: 0.9 INJECTION, SOLUTION INTRAVENOUS at 23:34

## 2022-06-06 VITALS
HEART RATE: 67 BPM | TEMPERATURE: 97.6 F | OXYGEN SATURATION: 93 % | RESPIRATION RATE: 18 BRPM | DIASTOLIC BLOOD PRESSURE: 82 MMHG | SYSTOLIC BLOOD PRESSURE: 164 MMHG

## 2022-06-06 LAB
2HR DELTA HS TROPONIN: -3 NG/L
ALBUMIN SERPL BCP-MCNC: 3.7 G/DL (ref 3.5–5)
ALP SERPL-CCNC: 47 U/L (ref 46–116)
ALT SERPL W P-5'-P-CCNC: 39 U/L (ref 12–78)
ANION GAP SERPL CALCULATED.3IONS-SCNC: 8 MMOL/L (ref 4–13)
AST SERPL W P-5'-P-CCNC: 35 U/L (ref 5–45)
BASOPHILS # BLD AUTO: 0.01 THOUSANDS/ΜL (ref 0–0.1)
BASOPHILS NFR BLD AUTO: 0 % (ref 0–1)
BILIRUB SERPL-MCNC: 0.65 MG/DL (ref 0.2–1)
BUN SERPL-MCNC: 22 MG/DL (ref 5–25)
CALCIUM SERPL-MCNC: 9.7 MG/DL (ref 8.3–10.1)
CARDIAC TROPONIN I PNL SERPL HS: 26 NG/L
CARDIAC TROPONIN I PNL SERPL HS: 29 NG/L
CHLORIDE SERPL-SCNC: 106 MMOL/L (ref 100–108)
CO2 SERPL-SCNC: 27 MMOL/L (ref 21–32)
CREAT SERPL-MCNC: 0.92 MG/DL (ref 0.6–1.3)
EOSINOPHIL # BLD AUTO: 0.07 THOUSAND/ΜL (ref 0–0.61)
EOSINOPHIL NFR BLD AUTO: 1 % (ref 0–6)
ERYTHROCYTE [DISTWIDTH] IN BLOOD BY AUTOMATED COUNT: 13.3 % (ref 11.6–15.1)
GFR SERPL CREATININE-BSD FRML MDRD: 58 ML/MIN/1.73SQ M
GLUCOSE SERPL-MCNC: 118 MG/DL (ref 65–140)
HCT VFR BLD AUTO: 44.2 % (ref 34.8–46.1)
HGB BLD-MCNC: 14.1 G/DL (ref 11.5–15.4)
IMM GRANULOCYTES # BLD AUTO: 0.02 THOUSAND/UL (ref 0–0.2)
IMM GRANULOCYTES NFR BLD AUTO: 0 % (ref 0–2)
LIPASE SERPL-CCNC: 121 U/L (ref 73–393)
LYMPHOCYTES # BLD AUTO: 0.91 THOUSANDS/ΜL (ref 0.6–4.47)
LYMPHOCYTES NFR BLD AUTO: 10 % (ref 14–44)
MCH RBC QN AUTO: 29.3 PG (ref 26.8–34.3)
MCHC RBC AUTO-ENTMCNC: 31.9 G/DL (ref 31.4–37.4)
MCV RBC AUTO: 92 FL (ref 82–98)
MONOCYTES # BLD AUTO: 0.53 THOUSAND/ΜL (ref 0.17–1.22)
MONOCYTES NFR BLD AUTO: 6 % (ref 4–12)
NEUTROPHILS # BLD AUTO: 7.19 THOUSANDS/ΜL (ref 1.85–7.62)
NEUTS SEG NFR BLD AUTO: 83 % (ref 43–75)
NRBC BLD AUTO-RTO: 0 /100 WBCS
PLATELET # BLD AUTO: 280 THOUSANDS/UL (ref 149–390)
PMV BLD AUTO: 9.9 FL (ref 8.9–12.7)
POTASSIUM SERPL-SCNC: 4.3 MMOL/L (ref 3.5–5.3)
PROT SERPL-MCNC: 7.2 G/DL (ref 6.4–8.2)
RBC # BLD AUTO: 4.81 MILLION/UL (ref 3.81–5.12)
SODIUM SERPL-SCNC: 141 MMOL/L (ref 136–145)
WBC # BLD AUTO: 8.73 THOUSAND/UL (ref 4.31–10.16)

## 2022-06-06 PROCEDURE — 36415 COLL VENOUS BLD VENIPUNCTURE: CPT | Performed by: EMERGENCY MEDICINE

## 2022-06-06 PROCEDURE — 84484 ASSAY OF TROPONIN QUANT: CPT | Performed by: EMERGENCY MEDICINE

## 2022-06-06 PROCEDURE — 96361 HYDRATE IV INFUSION ADD-ON: CPT

## 2022-06-06 RX ORDER — ONDANSETRON 4 MG/1
4 TABLET, FILM COATED ORAL EVERY 8 HOURS PRN
Qty: 12 TABLET | Refills: 0 | Status: SHIPPED | OUTPATIENT
Start: 2022-06-06

## 2022-06-06 NOTE — ED ATTENDING ATTESTATION
6/5/2022  IZachary MD, saw and evaluated the patient  I have discussed the patient with the resident/non-physician practitioner and agree with the resident's/non-physician practitioner's findings, Plan of Care, and MDM as documented in the resident's/non-physician practitioner's note, except where noted  All available labs and Radiology studies were reviewed  I was present for key portions of any procedure(s) performed by the resident/non-physician practitioner and I was immediately available to provide assistance  At this point I agree with the current assessment done in the Emergency Department  I have conducted an independent evaluation of this patient a history and physical is as follows:    ED Course     Emergency Department Note- Emma Lo [de-identified] y o  female MRN: [id number]    Unit/Bed#: ED 13 Encounter: [id number]    Emma Lo is a [de-identified] y o  female who presents with   Chief Complaint   Patient presents with    Vomiting     About 3 weeks ago pt was admitted for 36 hours to tx small bowel obstruction  Pt reports vomiting x3 and mild abdominal pain  Pt reports emesis brown color  History of Present Illness   HPI:  Emma Lo is a [de-identified] y o  female who presents for evaluation of:  Vomiting multiple times tonight with mild abdominal discomfort  Patient was admitted for SBO 3 weeks ago  Patient denies hematemesis  Patient notes that in the ED she feels completely better  Patient denies any hematemesis; mostly brown vomitus  Patient denies any abdominal discomfort presently  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  Review of Systems   Constitutional: Positive for chills (after vomiting)  Negative for fever  HENT: Negative for congestion and rhinorrhea  Respiratory: Positive for cough  Negative for shortness of breath  Gastrointestinal: Negative for constipation and diarrhea  Neurological: Negative for light-headedness and headaches     All other systems reviewed and are negative  Historical Information   Past Medical History:   Diagnosis Date    Disease of thyroid gland     Hyperlipidemia     Hypertension      Past Surgical History:   Procedure Laterality Date    BREAST EXCISIONAL BIOPSY Left 1989    benign, guessing year    COLON SURGERY      OTHER SURGICAL HISTORY      THYROID SURGERY       Social History   Social History     Substance and Sexual Activity   Alcohol Use Yes    Alcohol/week: 4 0 - 6 0 standard drinks    Types: 2 - 3 Glasses of wine, 2 - 3 Cans of beer per week    Comment: social     Social History     Substance and Sexual Activity   Drug Use No     Social History     Tobacco Use   Smoking Status Never Smoker   Smokeless Tobacco Never Used     Family History:   Family History   Problem Relation Age of Onset    Stroke Mother     Prostate cancer Father 80    No Known Problems Sister     No Known Problems Daughter     No Known Problems Maternal Grandmother     No Known Problems Maternal Grandfather     No Known Problems Paternal Grandmother     No Known Problems Paternal Grandfather     Breast cancer Maternal Aunt         guessing 53-48    No Known Problems Maternal Aunt     No Known Problems Maternal Aunt     No Known Problems Maternal Aunt     No Known Problems Maternal Aunt     No Known Problems Paternal Aunt     No Known Problems Paternal Aunt        Meds/Allergies   PTA meds:   Prior to Admission Medications   Prescriptions Last Dose Informant Patient Reported? Taking? Cyanocobalamin (VITAMIN B 12 PO)  Self Yes No   Sig: Take 500 mg by mouth daily  DAILY MULTIPLE VITAMINS PO  Self Yes No   Sig: Take 1 tablet by mouth daily   DULoxetine (CYMBALTA) 20 mg capsule  Self Yes No   Sig: Take 20 mg by mouth daily   Multiple Vitamin (MULTIVITAMIN) tablet  Self Yes No   Sig: Take 1 tablet by mouth daily     Patient not taking: Reported on 5/11/2022   Omega-3 Fatty Acids (OMEGA-3 FISH OIL) 1200 MG CAPS  Self Yes No Sig: Take 2 capsules by mouth daily  Vitamin D, Cholecalciferol, 1000 UNITS CAPS  Self Yes No   Sig: Take 1 tablet by mouth daily  amLODIPine (NORVASC) 5 mg tablet   Yes No   Sig: Take 5 mg by mouth daily  aspirin 81 MG tablet  Self Yes No   Sig: Take 81 mg by mouth daily  calcium-vitamin D (OSCAL) 250-125 MG-UNIT per tablet  Self Yes No   Sig: Take 1 tablet by mouth daily  gabapentin (NEURONTIN) 100 mg capsule  Self Yes No   Sig: Take by mouth daily   traZODone (DESYREL) 150 mg tablet  Self Yes No   Si/2 TAB AT BEDTIME ORALLY 30 DAYS      Facility-Administered Medications: None     Allergies   Allergen Reactions    Amoxicillin     Clindamycin/Lincomycin     Gentamycin [Gentamicin]        Objective   First Vitals:   Blood Pressure: (!) 178/77 (22)  Pulse: 80 (22)  Temperature: 97 6 °F (36 4 °C) (22)  Temp Source: Oral (22)  Respirations: 18 (22)  SpO2: 95 % (22)    Current Vitals:   Blood Pressure: (!) 178/77 (22)  Pulse: 80 (225)  Temperature: 97 6 °F (36 4 °C) (22)  Temp Source: Oral (22)  Respirations: 18 (22)  SpO2: 95 % (22)    No intake or output data in the 24 hours ending 22    Invasive Devices  Report    Peripheral Intravenous Line  Duration           Peripheral IV 22 Left Antecubital <1 day          Drain  Duration           NG/OG/Enteral Tube Nasogastric 14 Fr Right nare 25 days                Physical Exam  Vitals and nursing note reviewed  Constitutional:       General: She is not in acute distress  Appearance: Normal appearance  She is well-developed  HENT:      Head: Normocephalic and atraumatic  Right Ear: External ear normal       Left Ear: External ear normal       Nose: Nose normal       Mouth/Throat:      Pharynx: No oropharyngeal exudate     Eyes:      Conjunctiva/sclera: Conjunctivae normal       Pupils: Pupils are equal, round, and reactive to light  Cardiovascular:      Rate and Rhythm: Normal rate and regular rhythm  Pulmonary:      Effort: Pulmonary effort is normal  No respiratory distress  Abdominal:      General: Abdomen is flat  There is no distension  Palpations: Abdomen is soft  Musculoskeletal:         General: No deformity  Normal range of motion  Cervical back: Normal range of motion and neck supple  Skin:     General: Skin is warm and dry  Capillary Refill: Capillary refill takes less than 2 seconds  Neurological:      General: No focal deficit present  Mental Status: She is alert and oriented to person, place, and time  Mental status is at baseline  Coordination: Coordination normal    Psychiatric:         Mood and Affect: Mood normal          Behavior: Behavior normal          Thought Content: Thought content normal          Judgment: Judgment normal            Medical Decision Makin  Acute vomiting: CTAP; CBC; CMP; symptoms resolved in the ED  No results found for this or any previous visit (from the past 36 hour(s))  CT abdomen pelvis without contrast    (Results Pending)         Portions of the record may have been created with voice recognition software  Occasional wrong word or "sound a like" substitutions may have occurred due to the inherent limitations of voice recognition software  Read the chart carefully and recognize, using context, where substitutions have occurred          Critical Care Time  Procedures

## 2022-06-06 NOTE — DISCHARGE INSTRUCTIONS
Patient Instructions: Today you were seen in the emergency department for nausea and vomiting  We reviewed your CT scan and your labs and determined that you would be able to be discharged  You should take zofran as needed for nausea  You should follow up with your primary care doctor  Please return to the emergency department if your symptoms get worse, you develop a fever, or you have any other symptoms we discussed today prior to discharge  Nice to meet you! Best of luck with everything!

## 2022-06-06 NOTE — ED PROVIDER NOTES
Final Diagnosis:  1  Nausea and vomiting    2  Hx SBO      ED Course as of 06/06/22 0307   Espinoza Sullivan Jun 05, 2022   2330 Procedure Note: EKG  Date/Time: 06/05/22 11:30 PM   Interpreted by: Keiko Peters  Indications / Diagnosis: N/V  ECG reviewed by me, the ED Provider: yes   The EKG demonstrates:  Rhythm: sinus rhythm with sinus arrhythmia   Intervals: normal intervals  Axis: normal axis  QRS/Blocks: normal QRS  ST Changes: No acute ST Changes, no STD/DEVI  Mon Jun 06, 2022   0014 CT abdomen pelvis without contrast  No acute intra-abdominal abnormality   0045 hs TnI 0hr: 29  Reassessed, will PO challenge  Pending delta troponin, likely discharge     Chief Complaint   Patient presents with    Vomiting     About 3 weeks ago pt was admitted for 36 hours to tx small bowel obstruction  Pt reports vomiting x3 and mild abdominal pain  Pt reports emesis brown color  ASSESSMENT + PLAN:   - Nursing note reviewed  1  N/V  -CT A+P, labs, EKG and troponin to evaluate for atypical ACS symptoms   -reassess  -reassuring CT, labs and EKG all within normal limits  -p o  Challenge successful, re-examination without any abdominal tenderness or rebound or guarding, will discharge      Procedures       Final Dispo   Patient was reassessed  Vital signs stable  Patient and/or family given discharge instructions and return precautions  Patient and/or family was reassured  The patient and/or family vocalizes understanding  Answered all of the patient's and/or family's questions  Will follow up with PCP  Patient and/or family are agreeable to the plan          Medications   sodium chloride 0 9 % bolus 1,000 mL (0 mL Intravenous Stopped 6/6/22 0127)     Time reflects when diagnosis was documented in both MDM as applicable and the Disposition within this note     Time User Action Codes Description Comment    6/6/2022  1:52 AM Simuel Drivers Add [R11 2] Nausea and vomiting     6/6/2022  1:52 AM Simuel Drivers Add [Z87 19] Hx SBO       ED Disposition     ED Disposition   Discharge    Condition   Stable    Date/Time   Mon Jun 6, 2022  1:52 AM    Comment   Dominguez العلي discharge to home/self care  Follow-up Information     Follow up With Specialties Details Why Contact Info    Martina Torres MD [facility] Medicine Schedule an appointment as soon as possible for a visit   Aj Casanova [address]  [phone]          Discharge Medication List as of 6/6/2022  1:58 AM      START taking these medications    Details   ondansetron (ZOFRAN) 4 mg tablet Take 1 tablet (4 mg total) by mouth every 8 (eight) hours as needed for nausea or vomiting, Starting Mon 6/6/2022, Normal         CONTINUE these medications which have NOT CHANGED    Details   amLODIPine (NORVASC) 5 mg tablet Take 5 mg by mouth daily  , Historical Med      aspirin 81 MG tablet Take 81 mg by mouth daily  , Historical Med      calcium-vitamin D (OSCAL) 250-125 MG-UNIT per tablet Take 1 tablet by mouth daily  , Historical Med      Cyanocobalamin (VITAMIN B 12 PO) Take 500 mg by mouth daily  , Historical Med      !! DAILY MULTIPLE VITAMINS PO Take 1 tablet by mouth daily, Historical Med      DULoxetine (CYMBALTA) 20 mg capsule Take 20 mg by mouth daily, Historical Med      gabapentin (NEURONTIN) 100 mg capsule Take by mouth daily, Starting Sat 4/2/2011, Historical Med      !! Multiple Vitamin (MULTIVITAMIN) tablet Take 1 tablet by mouth daily  , Historical Med      Omega-3 Fatty Acids (OMEGA-3 FISH OIL) 1200 MG CAPS Take 2 capsules by mouth daily  , Historical Med      traZODone (DESYREL) 150 mg tablet 1/2 TAB AT BEDTIME ORALLY 30 DAYS, Historical Med      Vitamin D, Cholecalciferol, 1000 UNITS CAPS Take 1 tablet by mouth daily  , Historical Med       !! - Potential duplicate medications found  Please discuss with provider  No discharge procedures on file  Prior to Admission Medications   Prescriptions Last Dose Informant Patient Reported? Taking?    Cyanocobalamin (VITAMIN B 12 PO)  Self Yes No   Sig: Take 500 mg by mouth daily  DAILY MULTIPLE VITAMINS PO  Self Yes No   Sig: Take 1 tablet by mouth daily   DULoxetine (CYMBALTA) 20 mg capsule  Self Yes No   Sig: Take 20 mg by mouth daily   Multiple Vitamin (MULTIVITAMIN) tablet  Self Yes No   Sig: Take 1 tablet by mouth daily  Patient not taking: Reported on 2022   Omega-3 Fatty Acids (OMEGA-3 FISH OIL) 1200 MG CAPS  Self Yes No   Sig: Take 2 capsules by mouth daily  Vitamin D, Cholecalciferol, 1000 UNITS CAPS  Self Yes No   Sig: Take 1 tablet by mouth daily  amLODIPine (NORVASC) 5 mg tablet   Yes No   Sig: Take 5 mg by mouth daily  aspirin 81 MG tablet  Self Yes No   Sig: Take 81 mg by mouth daily  calcium-vitamin D (OSCAL) 250-125 MG-UNIT per tablet  Self Yes No   Sig: Take 1 tablet by mouth daily  gabapentin (NEURONTIN) 100 mg capsule  Self Yes No   Sig: Take by mouth daily   traZODone (DESYREL) 150 mg tablet  Self Yes No   Si/2 TAB AT BEDTIME ORALLY 30 DAYS      Facility-Administered Medications: None       History of Present Illness: This is a [de-identified] y o  female PMH significant for large bowel resection coming in today with complaint of nausea vomiting    Onset:  Acute  Location: None  Duration:  Shortly after lunch today  Radiation:  None  Associated Symptoms:  Patient with nausea and vomiting, no abdominal pain, last episode of vomiting was several hours ago  No dysuria, no diarrhea, no constipation  Severity: Mild to moderate  Attempted Treatments:  None  Historical Elements:  Today patient had a normal lunch, then she says that shortly after she woke up from a nap she started to have some nausea and vomitting  She says that she had 3 episodes of vomiting  She says no bloody or coffee-ground emesis  Was recently admitted 3 weeks ago for small bowel obstruction  No abdominal pain today  Patient feels much better now      Relevant Social History  None    - No language barrier    - History obtained from patient and chart and daughter  - Reviewed and documented relevant past medical/family/social history  - There are no limitations to the history obtained  - Previous charting was reviewed  Some data reviewed included below for ease of access whether or not it is relevant to this patient encounter  Past Medical, Past Surgical History:    has a past medical history of Disease of thyroid gland, Hyperlipidemia, and Hypertension  has a past surgical history that includes Thyroid surgery; Other surgical history; Colon surgery; and Breast excisional biopsy (Left, 1989)  Allergies: Allergies   Allergen Reactions    Amoxicillin     Clindamycin/Lincomycin     Gentamycin [Gentamicin]        Social and Family History:     Social History     Substance and Sexual Activity   Alcohol Use Yes    Alcohol/week: 4 0 - 6 0 standard drinks    Types: 2 - 3 Glasses of wine, 2 - 3 Cans of beer per week    Comment: social     Social History     Tobacco Use   Smoking Status Never Smoker   Smokeless Tobacco Never Used     Social History     Substance and Sexual Activity   Drug Use No       Review of Systems:   Review of Systems   Constitutional: Negative for chills, diaphoresis and fever  HENT: Negative  Eyes: Negative  Negative for visual disturbance  Respiratory: Negative  Negative for shortness of breath  Cardiovascular: Negative  Negative for chest pain  Gastrointestinal: Positive for nausea and vomiting  Negative for abdominal pain  Endocrine: Negative  Genitourinary: Negative  Musculoskeletal: Negative  Negative for myalgias  Skin: Negative  Negative for rash  Allergic/Immunologic: Negative  Neurological: Negative  Negative for weakness, light-headedness, numbness and headaches  Hematological: Negative  Psychiatric/Behavioral: Negative  All other systems reviewed and are negative         Physical Examination     Vitals:    06/05/22 2255 06/05/22 2318 06/06/22 0008   BP: (!) 178/77  164/82   BP Location: Right arm  Right arm   Pulse: 80  67   Resp: 18  18   Temp:  97 6 °F (36 4 °C)    TempSrc:  Oral    SpO2: 95%  93%     Vitals reviewed by me  Physical Exam  Vitals and nursing note reviewed  Constitutional:       General: She is not in acute distress  Appearance: She is not ill-appearing or toxic-appearing  HENT:      Head: Atraumatic  Right Ear: External ear normal       Left Ear: External ear normal       Nose: Nose normal       Mouth/Throat:      Pharynx: Oropharynx is clear  Eyes:      General: No scleral icterus  Extraocular Movements: Extraocular movements intact  Pupils: Pupils are equal, round, and reactive to light  Cardiovascular:      Rate and Rhythm: Normal rate  Pulses: Normal pulses  Pulmonary:      Effort: Pulmonary effort is normal  No respiratory distress  Breath sounds: Normal breath sounds  Abdominal:      General: There is no distension  Tenderness: There is no abdominal tenderness  Musculoskeletal:         General: No deformity  Normal range of motion  Skin:     Findings: No rash  Neurological:      General: No focal deficit present  Mental Status: She is oriented to person, place, and time  Mental status is at baseline  Cranial Nerves: No cranial nerve deficit  Motor: No weakness        Gait: Gait normal    Psychiatric:         Mood and Affect: Mood normal             Risk Stratification Tools     HEART Risk Score    Flowsheet Row Most Recent Value   Heart Score Risk Calculator    History 0 Filed at: 06/06/2022 0147   ECG 0 Filed at: 06/06/2022 0147   Age 2 Filed at: 06/06/2022 0147   Risk Factors 2 Filed at: 06/06/2022 0147   Troponin 1 Filed at: 06/06/2022 0147   HEART Score 5 Filed at: 06/06/2022 0147                   ED Course as of 06/06/22 0307   Sun Jun 05, 2022   2330 Procedure Note: EKG  Date/Time: 06/05/22 11:30 PM   Interpreted by: Cori Mcleod  Indications / Diagnosis: N/V  ECG reviewed by me, the ED Provider: yes   The EKG demonstrates:  Rhythm: sinus rhythm with sinus arrhythmia   Intervals: normal intervals  Axis: normal axis  QRS/Blocks: normal QRS  ST Changes: No acute ST Changes, no STD/DEVI  Mon Jun 06, 2022   0014 CT abdomen pelvis without contrast  No acute intra-abdominal abnormality   0045 hs TnI 0hr: 29  Reassessed, will PO challenge  Pending delta troponin, likely discharge     CT abdomen pelvis without contrast   Final Result      No acute intra-abdominal abnormality  No free air or free fluid  No evidence of large or small bowel obstruction        Stable 2 5 cm right renal hyperdense cyst             Workstation performed: [id number]           Orders Placed This Encounter   Procedures    CT abdomen pelvis without contrast    CBC and differential    CMP    Lipase    HS Troponin 0hr (reflex protocol)    HS Troponin I 2hr    ECG 12 lead       Labs:     Labs Reviewed   CBC AND DIFFERENTIAL - Abnormal       Result Value Ref Range Status    WBC 8 73  4 31 - 10 16 Thousand/uL Final    RBC 4 81  3 81 - 5 12 Million/uL Final    Hemoglobin 14 1  11 5 - 15 4 g/dL Final    Hematocrit 44 2  34 8 - 46 1 % Final    MCV 92  82 - 98 fL Final    MCH 29 3  26 8 - 34 3 pg Final    MCHC 31 9  31 4 - 37 4 g/dL Final    RDW 13 3  11 6 - 15 1 % Final    MPV 9 9  8 9 - 12 7 fL Final    Platelets 395  560 - 390 Thousands/uL Final    nRBC 0  /100 WBCs Final    Neutrophils Relative 83 (*) 43 - 75 % Final    Immat GRANS % 0  0 - 2 % Final    Lymphocytes Relative 10 (*) 14 - 44 % Final    Monocytes Relative 6  4 - 12 % Final    Eosinophils Relative 1  0 - 6 % Final    Basophils Relative 0  0 - 1 % Final    Neutrophils Absolute 7 19  1 85 - 7 62 Thousands/µL Final    Immature Grans Absolute 0 02  0 00 - 0 20 Thousand/uL Final    Lymphocytes Absolute 0 91  0 60 - 4 47 Thousands/µL Final    Monocytes Absolute 0 53  0 17 - 1 22 Thousand/µL Final    Eosinophils Absolute 0 07  0 00 - 0 61 Thousand/µL Final    Basophils Absolute 0 01  0 00 - 0 10 Thousands/µL Final   LIPASE - Normal    Lipase 121  73 - 393 u/L Final   HS TROPONIN I 0HR - Normal    hs TnI 0hr 29  "Refer to ACS Flowchart"- see link ng/L Final    Comment:                                              Initial (time 0) result  If >=50 ng/L, Myocardial injury suggested ;  Type of myocardial injury and treatment strategy  to be determined  If 5-49 ng/L, a delta result at 2 hours and or 4 hours will be needed to further evaluate  If <4 ng/L, and chest pain has been >3 hours since onset, patient may qualify for discharge based on the HEART score in the ED  If <5 ng/L and <3hours since onset of chest pain, a delta result at 2 hours will be needed to further evaluate  HS Troponin 99th Percentile URL of a Health Population=12 ng/L with a 95% Confidence Interval of 8-18 ng/L  Second Troponin (time 2 hours)  If calculated delta >= 20 ng/L,  Myocardial injury suggested ; Type of myocardial injury and treatment strategy to be determined  If 5-49 ng/L and the calculated delta is 5-19 ng/L, consult medical service for evaluation  Continue evaluation for ischemia on ecg and other possible etiology and repeat hs troponin at 4 hours  If delta is <5 ng/L at 2 hours, consider discharge based on risk stratification via the HEART score (if in ED), or LEE risk score in IP/Observation  HS Troponin 99th Percentile URL of a Health Population=12 ng/L with a 95% Confidence Interval of 8-18 ng/L    HS TROPONIN I 2HR - Normal    hs TnI 2hr 26  "Refer to ACS Flowchart"- see link ng/L Final    Comment:                                              Initial (time 0) result  If >=50 ng/L, Myocardial injury suggested ;  Type of myocardial injury and treatment strategy  to be determined  If 5-49 ng/L, a delta result at 2 hours and or 4 hours will be needed to further evaluate    If <4 ng/L, and chest pain has been >3 hours since onset, patient may qualify for discharge based on the HEART score in the ED  If <5 ng/L and <3hours since onset of chest pain, a delta result at 2 hours will be needed to further evaluate  HS Troponin 99th Percentile URL of a Health Population=12 ng/L with a 95% Confidence Interval of 8-18 ng/L  Second Troponin (time 2 hours)  If calculated delta >= 20 ng/L,  Myocardial injury suggested ; Type of myocardial injury and treatment strategy to be determined  If 5-49 ng/L and the calculated delta is 5-19 ng/L, consult medical service for evaluation  Continue evaluation for ischemia on ecg and other possible etiology and repeat hs troponin at 4 hours  If delta is <5 ng/L at 2 hours, consider discharge based on risk stratification via the HEART score (if in ED), or LEE risk score in IP/Observation  HS Troponin 99th Percentile URL of a Health Population=12 ng/L with a 95% Confidence Interval of 8-18 ng/L  Delta 2hr hsTnI -3  <20 ng/L Final   COMPREHENSIVE METABOLIC PANEL    Sodium 349  136 - 145 mmol/L Final    Potassium 4 3  3 5 - 5 3 mmol/L Final    Chloride 106  100 - 108 mmol/L Final    CO2 27  21 - 32 mmol/L Final    ANION GAP 8  4 - 13 mmol/L Final    BUN 22  5 - 25 mg/dL Final    Creatinine 0 92  0 60 - 1 30 mg/dL Final    Comment: Standardized to IDMS reference method    Glucose 118  65 - 140 mg/dL Final    Comment: If the patient is fasting, the ADA then defines impaired fasting glucose as > 100 mg/dL and diabetes as > or equal to 123 mg/dL  Specimen collection should occur prior to Sulfasalazine administration due to the potential for falsely depressed results  Specimen collection should occur prior to Sulfapyridine administration due to the potential for falsely elevated results  Calcium 9 7  8 3 - 10 1 mg/dL Final    AST 35  5 - 45 U/L Final    Comment: Specimen collection should occur prior to Sulfasalazine administration due to the potential for falsely depressed results       ALT 39  12 - 78 U/L Final    Comment: Specimen collection should occur prior to Sulfasalazine and/or Sulfapyridine administration due to the potential for falsely depressed results  Alkaline Phosphatase 47  46 - 116 U/L Final    Total Protein 7 2  6 4 - 8 2 g/dL Final    Albumin 3 7  3 5 - 5 0 g/dL Final    Total Bilirubin 0 65  0 20 - 1 00 mg/dL Final    Comment: Use of this assay is not recommended for patients undergoing treatment with eltrombopag due to the potential for falsely elevated results  eGFR 58  ml/min/1 73sq m Final    Narrative:     [place] guidelines for Chronic Kidney Disease (CKD):     Stage 1 with normal or high GFR (GFR > 90 mL/min/1 73 square meters)    Stage 2 Mild CKD (GFR = 60-89 mL/min/1 73 square meters)    Stage 3A Moderate CKD (GFR = 45-59 mL/min/1 73 square meters)    Stage 3B Moderate CKD (GFR = 30-44 mL/min/1 73 square meters)    Stage 4 Severe CKD (GFR = 15-29 mL/min/1 73 square meters)    Stage 5 End Stage CKD (GFR <15 mL/min/1 73 square meters)  Note: GFR calculation is accurate only with a steady state creatinine       Imaging:   CT abdomen pelvis without contrast    Result Date: 6/5/2022  Narrative: CT ABDOMEN AND PELVIS WITHOUT IV CONTRAST INDICATION:   Abdominal pain, acute, nonlocalized abdominal pain  COMPARISON:  None  TECHNIQUE:  CT examination of the abdomen and pelvis was performed without intravenous contrast  This examination was performed without intravenous contrast in the context of the critical nationwide Omnipaque shortage  Axial, sagittal, and coronal 2D reformatted images were created from the source data and submitted for interpretation  Radiation dose length product (DLP) for this visit:  291 22 mGy-cm   This examination, like all CT scans performed in the [facility], was performed utilizing techniques to minimize radiation dose exposure, including the use of iterative  reconstruction and automated exposure control  Enteric contrast was administered  FINDINGS: ABDOMEN LOWER CHEST:  Atelectatic changes are noted at the lung bases  LIVER/BILIARY TREE:  Unremarkable  GALLBLADDER:  No calcified gallstones  No pericholecystic inflammatory change  SPLEEN:  Unremarkable  PANCREAS:  Unremarkable  ADRENAL GLANDS:  Unremarkable  KIDNEYS/URETERS:  No hydronephrosis  Multiple bilateral renal cysts are again noted  A hyperdense cyst at the right renal midpole measures 2 4 cm similar to the prior exam  STOMACH AND BOWEL:  There is a small to moderate sized sliding hiatal hernia  Postoperative changes of prior colectomy are present  There is a small sliding hiatal hernia  APPENDIX:  Not visualized  ABDOMINOPELVIC CAVITY:  No ascites  No pneumoperitoneum  No lymphadenopathy  VESSELS:  Unremarkable for patient's age  PELVIS REPRODUCTIVE ORGANS:  Unremarkable for patient's age  URINARY BLADDER:  Unremarkable  ABDOMINAL WALL/INGUINAL REGIONS:  Unremarkable  OSSEOUS STRUCTURES:  No acute fracture or destructive osseous lesion  Lumbar levoscoliosis is again noted  Impression: No acute intra-abdominal abnormality  No free air or free fluid  No evidence of large or small bowel obstruction  Stable 2 5 cm right renal hyperdense cyst  Workstation performed: [id number]     CT abdomen pelvis wo contrast    Result Date: 5/11/2022  Narrative: CT ABDOMEN AND PELVIS WITHOUT IV CONTRAST INDICATION:   Bowel obstruction suspected Mid abdominal pain, N/V, hx of bowel resection  COMPARISON:  None  TECHNIQUE:  CT examination of the abdomen and pelvis was performed without intravenous contrast  This examination was performed without intravenous contrast in the context of the critical nationwide [place] shortage  Axial, sagittal, and coronal 2D reformatted images were created from the source data and submitted for interpretation  Radiation dose length product (DLP) for this visit:  353 69 mGy-cm     This examination, like all CT scans performed in the [facility], was performed utilizing techniques to minimize radiation dose exposure, including the use of iterative  reconstruction and automated exposure control  Enteric contrast was administered  FINDINGS: ABDOMEN LOWER CHEST:  Atelectatic changes are noted at the lung bases  LIVER/BILIARY TREE:  Unremarkable  GALLBLADDER:  No calcified gallstones  No pericholecystic inflammatory change  SPLEEN:  Unremarkable  PANCREAS:  Unremarkable  ADRENAL GLANDS:  Unremarkable  KIDNEYS/URETERS:  No hydronephrosis  Multiple bilateral renal cysts are again noted  A hyperdense cyst at the right renal midpole measures 2 4 cm similar to the prior exam  STOMACH AND BOWEL:  There is a moderate to large sized sliding hiatal hernia  Postoperative changes of prior colectomy are present  There are multiple loops of abnormally dilated proximal to mid small bowel with relative collapse of the distal small bowel most compatible with a small bowel obstruction  A transition point is noted at the anterior mid right abdomen (series 2, image 56)  A majority of the administered oral contrast is located within the stomach  APPENDIX:  Surgically absent  ABDOMINOPELVIC CAVITY:  There is trace perihepatic free fluid  No pneumoperitoneum  No lymphadenopathy  VESSELS:  Unremarkable for patient's age  PELVIS REPRODUCTIVE ORGANS:  Unremarkable for patient's age  URINARY BLADDER:  Unremarkable  ABDOMINAL WALL/INGUINAL REGIONS:  Unremarkable  OSSEOUS STRUCTURES:  No acute fracture or destructive osseous lesion  Lumbar levoscoliosis is again noted  Impression: Findings compatible with a small bowel obstruction  Surgical consultation is recommended  Trace intra-abdominal ascites  No free intraperitoneal air  Moderate to large sized sliding hiatal hernia    I personally discussed this study with Susu Munoz on 5/11/2022 at 4:58 AM  Workstation performed: [id number]     XR chest portable    Result Date: 5/11/2022  Narrative: CHEST INDICATION:   NGT adjusted, eval for placement  COMPARISON:  Prior chest x-ray performed earlier the same day  EXAM PERFORMED/VIEWS:  XR CHEST PORTABLE FINDINGS:  NG tube has been advanced into the stomach  Heart shadow is enlarged but unchanged from prior exam   Hiatal hernia noted  Minor bibasilar subsegmental atelectasis  No pneumothorax or pleural effusion  Osseous structures appear within normal limits for patient age  Impression: Minor bibasilar subsegmental atelectasis  NG tube located properly within the stomach  Workstation performed: [id number]     XR chest portable    Result Date: 5/11/2022  Narrative: CHEST INDICATION:   eval for NGT positioning  COMPARISON:  7/9/2016 EXAM PERFORMED/VIEWS:  XR CHEST PORTABLE FINDINGS:  NG tube terminating in the midesophagus  Cardiomediastinal silhouette appears unremarkable  Minor patchy bibasilar subsegmental atelectasis  No pneumothorax or pleural effusion  Osseous structures appear within normal limits for patient age  Impression: NG tube in the mid esophagus  Minor patchy bibasilar subsegmental atelectasis  Workstation performed: [id number]        Final Diagnosis:  1  Nausea and vomiting    2  Hx SBO        Code Status: Prior    Portions of the record may have been created with voice recognition software  Occasional wrong word or "sound a like" substitutions may have occurred due to the inherent limitations of voice recognition software  Read the chart carefully and recognize, using context, where substitutions have occurred      Electronically signed by:  Charly Anderson, PGY 3, MD Justino Mitchell MD  06/06/22 9569

## 2022-06-07 LAB
ATRIAL RATE: 69 BPM
P AXIS: 76 DEGREES
PR INTERVAL: 186 MS
QRS AXIS: 72 DEGREES
QRSD INTERVAL: 118 MS
QT INTERVAL: 422 MS
QTC INTERVAL: 452 MS
T WAVE AXIS: 52 DEGREES
VENTRICULAR RATE: 69 BPM

## 2022-06-07 PROCEDURE — 93010 ELECTROCARDIOGRAM REPORT: CPT | Performed by: INTERNAL MEDICINE

## 2022-12-07 ENCOUNTER — HOSPITAL ENCOUNTER (OUTPATIENT)
Dept: MAMMOGRAPHY | Facility: IMAGING CENTER | Age: 82
Discharge: HOME | End: 2022-12-07

## 2022-12-07 VITALS — WEIGHT: 118 LBS | HEIGHT: 62 IN | BODY MASS INDEX: 21.71 KG/M2

## 2022-12-07 DIAGNOSIS — Z12.31 ENCOUNTER FOR SCREENING MAMMOGRAM FOR MALIGNANT NEOPLASM OF BREAST: ICD-10-CM

## 2023-02-01 ENCOUNTER — HOSPITAL ENCOUNTER (OUTPATIENT)
Dept: BONE DENSITY | Facility: IMAGING CENTER | Age: 83
Discharge: HOME | End: 2023-02-01

## 2023-02-01 VITALS — HEIGHT: 61 IN | BODY MASS INDEX: 23.68 KG/M2 | WEIGHT: 125.4 LBS

## 2023-02-01 DIAGNOSIS — M85.80 OTHER SPECIFIED DISORDERS OF BONE DENSITY AND STRUCTURE, UNSPECIFIED SITE: ICD-10-CM

## 2023-02-09 RX ORDER — LISINOPRIL 10 MG/1
10 TABLET ORAL DAILY
COMMUNITY
Start: 2022-11-04

## 2023-02-09 RX ORDER — DULOXETIN HYDROCHLORIDE 30 MG/1
30 CAPSULE, DELAYED RELEASE ORAL DAILY
COMMUNITY
Start: 2023-01-03

## 2023-02-09 RX ORDER — LOPERAMIDE HYDROCHLORIDE 2 MG/1
CAPSULE ORAL
COMMUNITY
Start: 2023-01-04

## 2023-02-09 RX ORDER — FUROSEMIDE 20 MG/1
20 TABLET ORAL DAILY
COMMUNITY
Start: 2022-12-28

## 2023-02-09 RX ORDER — DULOXETIN HYDROCHLORIDE 60 MG/1
60 CAPSULE, DELAYED RELEASE ORAL DAILY
COMMUNITY
Start: 2023-01-03

## 2023-02-14 ENCOUNTER — OFFICE VISIT (OUTPATIENT)
Dept: OBGYN CLINIC | Facility: HOSPITAL | Age: 83
End: 2023-02-14

## 2023-02-14 VITALS
HEIGHT: 61 IN | SYSTOLIC BLOOD PRESSURE: 133 MMHG | WEIGHT: 127 LBS | BODY MASS INDEX: 23.98 KG/M2 | DIASTOLIC BLOOD PRESSURE: 73 MMHG | HEART RATE: 92 BPM

## 2023-02-14 DIAGNOSIS — M17.11 PRIMARY OSTEOARTHRITIS OF RIGHT KNEE: Primary | ICD-10-CM

## 2023-02-14 NOTE — PROGRESS NOTES
Patient Name:  Preethi Tabares  MRN:  [id number]    Assessment & Plan     Right knee DJD  1  Patient did undergo right knee intra-articular corticosteroid injection by Dr Tiffanie Espinoza on 12/16/2022  She did receive approximately 1 month of relief undergoing the injection  She notes a return of her pain recently  2  Discussed hyaluronic acid injections with the patient  She wished to proceed with these  Authorization initiated for right knee Durolane injection  3  In the meantime continue activity modification to avoid pain and Tylenol as needed  4  Follow-up once Durolane injection is approved  Chief Complaint     Right knee pain    History of the Present Illness     Preethi Tabares is a 81 y o  female who reports to the office today for evaluation of her right knee  She notes a chronic history of pain which has been ongoing for approximately 5 years  She did see Dr Tiffanie Espinoza on 12/16/2022  At that time she received a right knee intra-articular corticosteroid injection  This injection provided 1 month of relief  She still notes persistent pain in the right knee localized primarily to the medial aspect  Pain is worse with prolonged standing and walking and weightbearing activities  She notes mild stiffness in the morning  She denies any weakness or instability  She does take Tylenol on occasion for pain  No numbness or tingling  No fevers or chills  Physical Exam     /73   Pulse 92   Ht 5' 1 2" (1 554 m)   Wt 57 6 kg (127 lb)   BMI 23 84 kg/m²     Right knee: Skin intact  Varus deformity appreciated  No erythema ecchymosis or swelling  No effusion  Tenderness to palpation medial joint line  Range of motion 0-1 10 with crepitation appreciated  Stable to varus stress  Slight laxity with valgus stress  Stable Lachman test   Negative posterior drawer signs  Negative Elissa's test     Eyes: Anicteric sclerae  ENT: Trachea midline  Lungs: Normal respiratory effort    CV: Capillary refill is less than 2 seconds  Skin: Intact without erythema  Lymph: No palpable lymphadenopathy  Neuro: Sensation is grossly intact to light touch  Psych: Mood and affect are appropriate  Data Review     I have personally reviewed pertinent films in PACS, and my interpretation follows:    X-rays right knee 12/16/2022: No acute osseous abnormality  No fracture or dislocation  Moderate degenerative changes medial and patellofemoral compartments  Past Medical History:   Diagnosis Date   • Disease of thyroid gland    • Hyperlipidemia    • Hypertension        Past Surgical History:   Procedure Laterality Date   • BREAST EXCISIONAL BIOPSY Left 1989    benign, guessing year   • COLON SURGERY     • OTHER SURGICAL HISTORY     • THYROID SURGERY         Allergies   Allergen Reactions   • Amoxicillin    • Clindamycin/Lincomycin    • Gentamycin [Gentamicin]    • Penicillins Rash       Current Outpatient Medications on File Prior to Visit   Medication Sig Dispense Refill   • amLODIPine (NORVASC) 5 mg tablet Take 5 mg by mouth daily  • aspirin 81 MG tablet Take 81 mg by mouth daily  • calcium-vitamin D (OSCAL) 250-125 MG-UNIT per tablet Take 1 tablet by mouth daily  • Cyanocobalamin (VITAMIN B 12 PO) Take 500 mg by mouth daily       • DAILY MULTIPLE VITAMINS PO Take 1 tablet by mouth daily     • DULoxetine (CYMBALTA) 20 mg capsule Take 20 mg by mouth daily     • DULoxetine (CYMBALTA) 30 mg delayed release capsule Take 30 mg by mouth daily     • DULoxetine (CYMBALTA) 60 mg delayed release capsule Take 60 mg by mouth daily     • furosemide (LASIX) 20 mg tablet Take 20 mg by mouth daily     • gabapentin (NEURONTIN) 100 mg capsule Take by mouth daily     • lisinopril (ZESTRIL) 10 mg tablet Take 10 mg by mouth daily     • loperamide (IMODIUM) 2 mg capsule TAKE TWO CAPSULES BY MOUTH EVERY MORNING AND TAKE ONE CAPSULE BY MOUTH EVERY EVENING     • Multiple Vitamin (MULTIVITAMIN) tablet Take 1 tablet by mouth daily • Omega-3 Fatty Acids (OMEGA-3 FISH OIL) 1200 MG CAPS Take 2 capsules by mouth daily  • ondansetron (ZOFRAN) 4 mg tablet Take 1 tablet (4 mg total) by mouth every 8 (eight) hours as needed for nausea or vomiting 12 tablet 0   • traZODone (DESYREL) 150 mg tablet 1/2 TAB AT BEDTIME ORALLY 30 DAYS  5   • Vitamin D, Cholecalciferol, 1000 UNITS CAPS Take 1 tablet by mouth daily  No current facility-administered medications on file prior to visit  Social History     Tobacco Use   • Smoking status: Never   • Smokeless tobacco: Never   Substance Use Topics   • Alcohol use: Yes     Alcohol/week: 4 0 - 6 0 standard drinks     Types: 2 - 3 Glasses of wine, 2 - 3 Cans of beer per week     Comment: social   • Drug use: No       Family History   Problem Relation Age of Onset   • Stroke Mother    • Prostate cancer Father 80   • No Known Problems Sister    • No Known Problems Daughter    • No Known Problems Maternal Grandmother    • No Known Problems Maternal Grandfather    • No Known Problems Paternal Grandmother    • No Known Problems Paternal Grandfather    • Breast cancer Maternal Aunt         guessing 50-55   • No Known Problems Maternal Aunt    • No Known Problems Maternal Aunt    • No Known Problems Maternal Aunt    • No Known Problems Maternal Aunt    • No Known Problems Paternal Aunt    • No Known Problems Paternal Aunt        Review of Systems     As stated in the HPI  All other systems reviewed and are negative

## 2023-03-28 ENCOUNTER — PROCEDURE VISIT (OUTPATIENT)
Dept: OBGYN CLINIC | Facility: HOSPITAL | Age: 83
End: 2023-03-28

## 2023-03-28 VITALS
WEIGHT: 127.2 LBS | HEART RATE: 92 BPM | BODY MASS INDEX: 24.02 KG/M2 | DIASTOLIC BLOOD PRESSURE: 77 MMHG | HEIGHT: 61 IN | SYSTOLIC BLOOD PRESSURE: 131 MMHG

## 2023-03-28 DIAGNOSIS — G89.29 CHRONIC PAIN OF RIGHT KNEE: ICD-10-CM

## 2023-03-28 DIAGNOSIS — M17.11 PRIMARY OSTEOARTHRITIS OF RIGHT KNEE: Primary | ICD-10-CM

## 2023-03-28 DIAGNOSIS — M25.561 CHRONIC PAIN OF RIGHT KNEE: ICD-10-CM

## 2023-03-28 NOTE — PROGRESS NOTES
Subjective; Adult female patient who is approved for Durolane injection right knee  Past Medical History:   Diagnosis Date   • Disease of thyroid gland    • Hyperlipidemia    • Hypertension        Past Surgical History:   Procedure Laterality Date   • BREAST EXCISIONAL BIOPSY Left 1989    benign, guessing year   • COLON SURGERY     • OTHER SURGICAL HISTORY     • THYROID SURGERY         Family History   Problem Relation Age of Onset   • Stroke Mother    • Prostate cancer Father 80   • No Known Problems Sister    • No Known Problems Daughter    • No Known Problems Maternal Grandmother    • No Known Problems Maternal Grandfather    • No Known Problems Paternal Grandmother    • No Known Problems Paternal Grandfather    • Breast cancer Maternal Aunt         guessing 50-55   • No Known Problems Maternal Aunt    • No Known Problems Maternal Aunt    • No Known Problems Maternal Aunt    • No Known Problems Maternal Aunt    • No Known Problems Paternal Aunt    • No Known Problems Paternal Aunt        Social History     Tobacco Use   • Smoking status: Never   • Smokeless tobacco: Never   Substance Use Topics   • Alcohol use: Yes     Alcohol/week: 4 0 - 6 0 standard drinks     Types: 2 - 3 Glasses of wine, 2 - 3 Cans of beer per week     Comment: social   • Drug use: No     Exam;    Patient's right knee is devoid of any redness bruising or palpable warmth,that would preclude safe administration of medication      Large joint arthrocentesis: R knee  Procedure Details  Location: knee - R knee  Needle size: 22 G (RP 27 G)  Medications administered: 3 mL sodium hyaluronate 60 MG/3ML    Patient tolerance: patient tolerated the procedure well with no immediate complications  Dressing:  Sterile dressing applied      Impression;    Right knee osteoarthritis  Right knee pain    Plan;    Duralane injection given  Return in 3 months  Observe her response, future injections ordered at this time    Experience was supervised by and plan formulated by the attending surgeon it was my privilege to assist him in delivery of her care

## 2023-03-29 ENCOUNTER — TELEPHONE (OUTPATIENT)
Dept: OBGYN CLINIC | Facility: HOSPITAL | Age: 83
End: 2023-03-29

## 2023-03-29 NOTE — TELEPHONE ENCOUNTER
Spoke to patient and advised this can be normal after visco injections, can have discomfort for 1 week and may take 4-6 weeks to get full afttect  In the meantime RICE method, Tylenol/NSAIDS OTC if able to take them, activity modification and offload with cane or walker  Will call back if symptoms do not improve over next few days

## 2023-03-29 NOTE — TELEPHONE ENCOUNTER
Caller: Patient    Doctor: Juancarlos Brown    Reason for call: Patient is calling stating she had an injection yesterday and she's having pain and her leg is stiff   She has been icing it, but would like to know if this is normal    Call back#: [phone]

## 2023-04-04 DIAGNOSIS — M17.11 ARTHRITIS OF KNEE, RIGHT: Primary | ICD-10-CM

## 2023-11-02 ENCOUNTER — PROCEDURE VISIT (OUTPATIENT)
Dept: OBGYN CLINIC | Facility: HOSPITAL | Age: 83
End: 2023-11-02
Payer: MEDICARE

## 2023-11-02 VITALS
SYSTOLIC BLOOD PRESSURE: 138 MMHG | WEIGHT: 121 LBS | BODY MASS INDEX: 22.84 KG/M2 | HEIGHT: 61 IN | HEART RATE: 97 BPM | DIASTOLIC BLOOD PRESSURE: 71 MMHG

## 2023-11-02 DIAGNOSIS — G89.29 CHRONIC PAIN OF RIGHT KNEE: Primary | ICD-10-CM

## 2023-11-02 DIAGNOSIS — M17.11 PRIMARY OSTEOARTHRITIS OF RIGHT KNEE: ICD-10-CM

## 2023-11-02 DIAGNOSIS — M25.561 CHRONIC PAIN OF RIGHT KNEE: Primary | ICD-10-CM

## 2023-11-02 PROCEDURE — 20610 DRAIN/INJ JOINT/BURSA W/O US: CPT | Performed by: ORTHOPAEDIC SURGERY

## 2023-11-02 RX ORDER — METHYLPREDNISOLONE 4 MG/1
TABLET ORAL
COMMUNITY
Start: 2023-07-27

## 2023-11-02 NOTE — PROGRESS NOTES
83 y.o.female presents for reevaluation. She has returned to weightbearing pain in the right knee. She has pain level the right knee joint, the pain is made worse bearing weight, the pain increases with increased activities. Pain levels are 9 out of 10 utilizing 10 point pain score today    Review of Systems  Review of systems negative unless otherwise specified in HPI    Past Medical History  Past Medical History:   Diagnosis Date    Disease of thyroid gland     Hyperlipidemia     Hypertension        Past Surgical History  Past Surgical History:   Procedure Laterality Date    BREAST EXCISIONAL BIOPSY Left 1989    benign, guessing year    COLON SURGERY      OTHER SURGICAL HISTORY      THYROID SURGERY         Current Medications  Current Outpatient Medications on File Prior to Visit   Medication Sig Dispense Refill    methylPREDNISolone 4 MG tablet therapy pack TAKE AS DIRECTED ON PACKAGE FOR 6 DAYS      amLODIPine (NORVASC) 5 mg tablet Take 5 mg by mouth daily. aspirin 81 MG tablet Take 81 mg by mouth daily. calcium-vitamin D (OSCAL) 250-125 MG-UNIT per tablet Take 1 tablet by mouth daily. Cyanocobalamin (VITAMIN B 12 PO) Take 500 mg by mouth daily.       DAILY MULTIPLE VITAMINS PO Take 1 tablet by mouth daily      DULoxetine (CYMBALTA) 20 mg capsule Take 20 mg by mouth daily      DULoxetine (CYMBALTA) 30 mg delayed release capsule Take 30 mg by mouth daily      DULoxetine (CYMBALTA) 60 mg delayed release capsule Take 60 mg by mouth daily      furosemide (LASIX) 20 mg tablet Take 20 mg by mouth daily      gabapentin (NEURONTIN) 100 mg capsule Take by mouth daily      lisinopril (ZESTRIL) 10 mg tablet Take 10 mg by mouth daily      loperamide (IMODIUM) 2 mg capsule TAKE TWO CAPSULES BY MOUTH EVERY MORNING AND TAKE ONE CAPSULE BY MOUTH EVERY EVENING      Multiple Vitamin (MULTIVITAMIN) tablet Take 1 tablet by mouth daily      Omega-3 Fatty Acids (OMEGA-3 FISH OIL) 1200 MG CAPS Take 2 capsules by mouth daily.      ondansetron (ZOFRAN) 4 mg tablet Take 1 tablet (4 mg total) by mouth every 8 (eight) hours as needed for nausea or vomiting 12 tablet 0    traZODone (DESYREL) 150 mg tablet 1/2 TAB AT BEDTIME ORALLY 30 DAYS  5    Vitamin D, Cholecalciferol, 1000 UNITS CAPS Take 1 tablet by mouth daily. No current facility-administered medications on file prior to visit. Recent Labs [facility])  0   Lab Value Date/Time    HCT 44.2 06/05/2022 2334    HCT 43.4 05/05/2015 0846    HGB 14.1 06/05/2022 2334    HGB 14.6 05/05/2015 0846    WBC 8.73 06/05/2022 2334    WBC 6.54 05/05/2015 0846    GLUCOSE 100 05/05/2015 0846         Physical exam  General: Awake, Alert, Oriented  Eyes: Pupils equal, round and reactive to light  Heart: regular rate and rhythm  Lungs: No audible wheezing  Abdomen: soft  Right knee has an intact soft tissue envelope. The knee is in varus. There is no effusion. There is bony enlargement tenderness medially. There is crepitation flexion-extension but there is no palp warmth of the synovium    Imaging  No new x-rays accompany her    Procedure  An injection of viscosupplementation provided for the right knee joint. It is documented below    Large joint arthrocentesis: R knee  Universal Protocol:  Consent given by: patient  Supporting Documentation  Indications: pain   Procedure Details  Location: knee - R knee  Needle size: 22 G  Ultrasound guidance: no  Medications administered: 3 mL sodium hyaluronate 60 MG/3ML    Patient tolerance: patient tolerated the procedure well with no immediate complications  Dressing:  Sterile dressing applied            Assessment/Plan:   81 y. o.female has return of pain and dysfunction in the right knee stemming from osteoarthritis. All diagnoses were discussed with the patient. Treatment option clued risk, benefits, return discussed in detail. An injection of viscosupplementation indicated.   It is advised, accepted, administer as outlined above.   Office follow-up in 3 months time would be my recommendation

## 2024-02-06 ENCOUNTER — OFFICE VISIT (OUTPATIENT)
Dept: OBGYN CLINIC | Facility: HOSPITAL | Age: 84
End: 2024-02-06
Payer: MEDICARE

## 2024-02-06 VITALS
SYSTOLIC BLOOD PRESSURE: 160 MMHG | DIASTOLIC BLOOD PRESSURE: 90 MMHG | WEIGHT: 118 LBS | HEIGHT: 61 IN | HEART RATE: 93 BPM | BODY MASS INDEX: 22.28 KG/M2

## 2024-02-06 DIAGNOSIS — M17.11 PRIMARY OSTEOARTHRITIS OF RIGHT KNEE: ICD-10-CM

## 2024-02-06 DIAGNOSIS — M25.561 ACUTE PAIN OF RIGHT KNEE: Primary | ICD-10-CM

## 2024-02-06 PROCEDURE — 99212 OFFICE O/P EST SF 10 MIN: CPT | Performed by: ORTHOPAEDIC SURGERY

## 2024-02-06 NOTE — PROGRESS NOTES
83 y.o.female presents 3 months following viscosupplementation of the right knee.  She has not initiated a strengthening program for her leg, and feels that this in conjunction with the lubricant therapy has resulted in significant ongoing relief of her pretreatment weightbearing pain in the right knee.  Today pain levels are less than 7 out of 10 in the right knee    Review of Systems  Review of systems negative unless otherwise specified in HPI    Past Medical History  Past Medical History:   Diagnosis Date    Disease of thyroid gland     Hyperlipidemia     Hypertension        Past Surgical History  Past Surgical History:   Procedure Laterality Date    BREAST EXCISIONAL BIOPSY Left 1989    benign, guessing year    COLON SURGERY      OTHER SURGICAL HISTORY      THYROID SURGERY         Current Medications  Current Outpatient Medications on File Prior to Visit   Medication Sig Dispense Refill    amLODIPine (NORVASC) 5 mg tablet Take 5 mg by mouth daily.      aspirin 81 MG tablet Take 81 mg by mouth daily.      calcium-vitamin D (OSCAL) 250-125 MG-UNIT per tablet Take 1 tablet by mouth daily.      Cyanocobalamin (VITAMIN B 12 PO) Take 500 mg by mouth daily.      DAILY MULTIPLE VITAMINS PO Take 1 tablet by mouth daily      DULoxetine (CYMBALTA) 20 mg capsule Take 20 mg by mouth daily      DULoxetine (CYMBALTA) 30 mg delayed release capsule Take 30 mg by mouth daily      DULoxetine (CYMBALTA) 60 mg delayed release capsule Take 60 mg by mouth daily      furosemide (LASIX) 20 mg tablet Take 20 mg by mouth daily      gabapentin (NEURONTIN) 100 mg capsule Take by mouth daily      lisinopril (ZESTRIL) 10 mg tablet Take 10 mg by mouth daily      loperamide (IMODIUM) 2 mg capsule TAKE TWO CAPSULES BY MOUTH EVERY MORNING AND TAKE ONE CAPSULE BY MOUTH EVERY EVENING      methylPREDNISolone 4 MG tablet therapy pack TAKE AS DIRECTED ON PACKAGE FOR 6 DAYS      Multiple Vitamin (MULTIVITAMIN) tablet Take 1 tablet by mouth daily       Omega-3 Fatty Acids (OMEGA-3 FISH OIL) 1200 MG CAPS Take 2 capsules by mouth daily.      ondansetron (ZOFRAN) 4 mg tablet Take 1 tablet (4 mg total) by mouth every 8 (eight) hours as needed for nausea or vomiting 12 tablet 0    traZODone (DESYREL) 150 mg tablet 1/2 TAB AT BEDTIME ORALLY 30 DAYS  5    Vitamin D, Cholecalciferol, 1000 UNITS CAPS Take 1 tablet by mouth daily.       No current facility-administered medications on file prior to visit.       Recent Labs (HCT,HGB,PT,INR,ESR,CRP,GLU,HgA1C)  0   Lab Value Date/Time    HCT 44.2 06/05/2022 2334    HCT 43.4 05/05/2015 0846    HGB 14.1 06/05/2022 2334    HGB 14.6 05/05/2015 0846    WBC 8.73 06/05/2022 2334    WBC 6.54 05/05/2015 0846    GLUCOSE 100 05/05/2015 0846         Physical exam  General: Awake, Alert, Oriented  Eyes: Pupils equal, round and reactive to light  Heart: regular rate and rhythm  Lungs: No audible wheezing  Abdomen: soft  Right knee has varus alignment.  The soft tissue envelope is intact but is no effusion.  There is incomplete extension and but very good flexion.  There is soft crepitation at the patellofemoral to chelation.  There is bony enlargement but no tenderness on the medial joint line    Imaging  No new x-rays accompany her    Procedure  Indicated performed today    Assessment/Plan:   83 y.o.female 3 months following viscosupplementation of the right knee with ongoing relief of her pretreatment arthritic symptoms of the right knee.  All diagnoses were discussed with the patient.  Treatment option including risk, benefits, return discussed in detail.  A treatment holiday is my recommendation, and office follow-up in 3 months time with plans for repeat viscosupplementation right knee would be my final recommendation

## 2024-05-09 ENCOUNTER — PROCEDURE VISIT (OUTPATIENT)
Dept: OBGYN CLINIC | Facility: HOSPITAL | Age: 84
End: 2024-05-09
Payer: MEDICARE

## 2024-05-09 VITALS
HEART RATE: 96 BPM | SYSTOLIC BLOOD PRESSURE: 131 MMHG | BODY MASS INDEX: 22.15 KG/M2 | HEIGHT: 61 IN | DIASTOLIC BLOOD PRESSURE: 74 MMHG

## 2024-05-09 DIAGNOSIS — G89.29 CHRONIC PAIN OF RIGHT KNEE: Primary | ICD-10-CM

## 2024-05-09 DIAGNOSIS — M17.11 PRIMARY OSTEOARTHRITIS OF RIGHT KNEE: ICD-10-CM

## 2024-05-09 DIAGNOSIS — M25.561 CHRONIC PAIN OF RIGHT KNEE: Primary | ICD-10-CM

## 2024-05-09 PROCEDURE — 20610 DRAIN/INJ JOINT/BURSA W/O US: CPT | Performed by: ORTHOPAEDIC SURGERY

## 2024-05-09 RX ORDER — CEFUROXIME AXETIL 250 MG/1
TABLET ORAL
COMMUNITY
Start: 2024-03-06

## 2024-05-09 NOTE — PROGRESS NOTES
83 y.o.female presents for reevaluation.  She has returned to weightbearing pain in the right knee.  She notes pain at the level of the right knee joint, the pain is made worse bearing weight, the pain increases with increased activities.  With regularity her pain levels approximate 9 out of 10 on the right knee    Review of Systems  Review of systems negative unless otherwise specified in HPI    Past Medical History  Past Medical History:   Diagnosis Date    Disease of thyroid gland     Hyperlipidemia     Hypertension        Past Surgical History  Past Surgical History:   Procedure Laterality Date    BREAST EXCISIONAL BIOPSY Left 1989    benign, guessing year    COLON SURGERY      OTHER SURGICAL HISTORY      THYROID SURGERY         Current Medications  Current Outpatient Medications on File Prior to Visit   Medication Sig Dispense Refill    cefuroxime (CEFTIN) 250 mg tablet take 1 tablet by mouth every 12 hours for 7 days      amLODIPine (NORVASC) 5 mg tablet Take 5 mg by mouth daily.      aspirin 81 MG tablet Take 81 mg by mouth daily.      calcium-vitamin D (OSCAL) 250-125 MG-UNIT per tablet Take 1 tablet by mouth daily.      Cyanocobalamin (VITAMIN B 12 PO) Take 500 mg by mouth daily.      DAILY MULTIPLE VITAMINS PO Take 1 tablet by mouth daily      DULoxetine (CYMBALTA) 20 mg capsule Take 20 mg by mouth daily      DULoxetine (CYMBALTA) 30 mg delayed release capsule Take 30 mg by mouth daily      DULoxetine (CYMBALTA) 60 mg delayed release capsule Take 60 mg by mouth daily      furosemide (LASIX) 20 mg tablet Take 20 mg by mouth daily      gabapentin (NEURONTIN) 100 mg capsule Take by mouth daily      lisinopril (ZESTRIL) 10 mg tablet Take 10 mg by mouth daily      loperamide (IMODIUM) 2 mg capsule TAKE TWO CAPSULES BY MOUTH EVERY MORNING AND TAKE ONE CAPSULE BY MOUTH EVERY EVENING      methylPREDNISolone 4 MG tablet therapy pack TAKE AS DIRECTED ON PACKAGE FOR 6 DAYS      Multiple Vitamin (MULTIVITAMIN) tablet  Take 1 tablet by mouth daily      Omega-3 Fatty Acids (OMEGA-3 FISH OIL) 1200 MG CAPS Take 2 capsules by mouth daily.      ondansetron (ZOFRAN) 4 mg tablet Take 1 tablet (4 mg total) by mouth every 8 (eight) hours as needed for nausea or vomiting 12 tablet 0    traZODone (DESYREL) 150 mg tablet 1/2 TAB AT BEDTIME ORALLY 30 DAYS  5    Vitamin D, Cholecalciferol, 1000 UNITS CAPS Take 1 tablet by mouth daily.       No current facility-administered medications on file prior to visit.       Recent Labs (HCT,HGB,PT,INR,ESR,CRP,GLU,HgA1C)  0   Lab Value Date/Time    HCT 44.2 06/05/2022 2334    HCT 43.4 05/05/2015 0846    HGB 14.1 06/05/2022 2334    HGB 14.6 05/05/2015 0846    WBC 8.73 06/05/2022 2334    WBC 6.54 05/05/2015 0846    GLUCOSE 100 05/05/2015 0846         Physical exam  General: Awake, Alert, Oriented  Eyes: Pupils equal, round and reactive to light  Heart: regular rate and rhythm  Lungs: No audible wheezing  Abdomen: soft  Right knee is in varus.  There is no effusion.  There is bony enlargement and tenderness medially.  There is crepitation flexion-extension but there is no palp warmth of the synovium.  Calf compartments of the right are soft and supple.  Toes of the right foot are warm, sensate, and mobile    Imaging  No new x-rays accompany her    Procedure  An injection of viscosupplementation is provided to the right knee joint.  It is documented below      Large joint arthrocentesis: R knee  Universal Protocol:  Consent given by: patient  Supporting Documentation  Indications: pain   Procedure Details  Location: knee - R knee  Needle size: 22 G  Ultrasound guidance: no  Medications administered: 3 mL sodium hyaluronate 60 MG/3ML    Patient tolerance: patient tolerated the procedure well with no immediate complications  Dressing:  Sterile dressing applied            Assessment/Plan:   83 y.o.female who has return of symptomatic osteoarthritis in the right knee.  Her symptoms include pain and dysfunction.   All diagnoses were discussed with the patient.  Treatment option including risk, benefits, return discussed in detail.  An injection of viscosupplementation is indicated.  It is advised, accepted, administer as outlined above.  Office follow-up in 3 months time is my recommendation

## 2024-08-15 ENCOUNTER — OFFICE VISIT (OUTPATIENT)
Dept: OBGYN CLINIC | Facility: HOSPITAL | Age: 84
End: 2024-08-15
Payer: MEDICARE

## 2024-08-15 VITALS
SYSTOLIC BLOOD PRESSURE: 113 MMHG | HEIGHT: 61 IN | BODY MASS INDEX: 22.16 KG/M2 | WEIGHT: 117.4 LBS | DIASTOLIC BLOOD PRESSURE: 70 MMHG | HEART RATE: 98 BPM

## 2024-08-15 DIAGNOSIS — G89.29 CHRONIC PAIN OF RIGHT KNEE: ICD-10-CM

## 2024-08-15 DIAGNOSIS — M25.561 CHRONIC PAIN OF RIGHT KNEE: ICD-10-CM

## 2024-08-15 DIAGNOSIS — M17.11 PRIMARY OSTEOARTHRITIS OF RIGHT KNEE: Primary | ICD-10-CM

## 2024-08-15 PROCEDURE — 99213 OFFICE O/P EST LOW 20 MIN: CPT | Performed by: ORTHOPAEDIC SURGERY

## 2024-08-15 NOTE — PROGRESS NOTES
Assessment:   Diagnosis ICD-10-CM Associated Orders   1. Primary osteoarthritis of right knee  M17.11 Injection Procedure Prior Authorization      2. Chronic pain of right knee  M25.561 Injection Procedure Prior Authorization    G89.29           Plan:  83 y.o. female with known osteoarthritis of the right knee  S/p Durolane injection May,2024 with continued, prolonged pain relief  Offered but declined injection of corticosteroid to the right knee today   Prior auth initiated today for repeat Durolane injection to right knee   Follow up in 3 months     The above stated was discussed in layman's terms and the patient expressed understanding.  All questions were answered to the patient's satisfaction.     To do next visit:  Return in about 3 months (around 11/15/2024) for right knee.      Subjective:   Griselda العلي is a 83 y.o. female who presents for follow up of right knee pain.  Patient has known osteoarthritis of the right knee which has been treated in the past with intermittent injections of visco supplementation.  She admits to continued relief from these injections every 6 months and wishes to continue with treatment in 3 months.  Patient offered but declined injection of corticosteroid today.  Pain score 9/10      Review of systems negative unless otherwise specified in HPI    Past Medical History:   Diagnosis Date    Disease of thyroid gland     Hyperlipidemia     Hypertension        Past Surgical History:   Procedure Laterality Date    BREAST EXCISIONAL BIOPSY Left 1989    benign, guessing year    COLON SURGERY      OTHER SURGICAL HISTORY      THYROID SURGERY         Family History   Problem Relation Age of Onset    Stroke Mother     Prostate cancer Father 85    No Known Problems Sister     No Known Problems Daughter     No Known Problems Maternal Grandmother     No Known Problems Maternal Grandfather     No Known Problems Paternal Grandmother     No Known Problems Paternal Grandfather     Breast cancer  Maternal Aunt         guessing 50-55    No Known Problems Maternal Aunt     No Known Problems Maternal Aunt     No Known Problems Maternal Aunt     No Known Problems Maternal Aunt     No Known Problems Paternal Aunt     No Known Problems Paternal Aunt        Social History     Occupational History    Not on file   Tobacco Use    Smoking status: Never    Smokeless tobacco: Never   Substance and Sexual Activity    Alcohol use: Yes     Alcohol/week: 4.0 - 6.0 standard drinks of alcohol     Types: 2 - 3 Glasses of wine, 2 - 3 Cans of beer per week     Comment: social    Drug use: No    Sexual activity: Not Currently         Current Outpatient Medications:     amLODIPine (NORVASC) 5 mg tablet, Take 5 mg by mouth daily., Disp: , Rfl:     aspirin 81 MG tablet, Take 81 mg by mouth daily., Disp: , Rfl:     calcium-vitamin D (OSCAL) 250-125 MG-UNIT per tablet, Take 1 tablet by mouth daily., Disp: , Rfl:     cefuroxime (CEFTIN) 250 mg tablet, take 1 tablet by mouth every 12 hours for 7 days, Disp: , Rfl:     Cyanocobalamin (VITAMIN B 12 PO), Take 500 mg by mouth daily., Disp: , Rfl:     DAILY MULTIPLE VITAMINS PO, Take 1 tablet by mouth daily, Disp: , Rfl:     DULoxetine (CYMBALTA) 20 mg capsule, Take 20 mg by mouth daily, Disp: , Rfl:     DULoxetine (CYMBALTA) 30 mg delayed release capsule, Take 30 mg by mouth daily, Disp: , Rfl:     DULoxetine (CYMBALTA) 60 mg delayed release capsule, Take 60 mg by mouth daily, Disp: , Rfl:     furosemide (LASIX) 20 mg tablet, Take 20 mg by mouth daily, Disp: , Rfl:     gabapentin (NEURONTIN) 100 mg capsule, Take by mouth daily, Disp: , Rfl:     lisinopril (ZESTRIL) 10 mg tablet, Take 10 mg by mouth daily, Disp: , Rfl:     loperamide (IMODIUM) 2 mg capsule, TAKE TWO CAPSULES BY MOUTH EVERY MORNING AND TAKE ONE CAPSULE BY MOUTH EVERY EVENING, Disp: , Rfl:     methylPREDNISolone 4 MG tablet therapy pack, TAKE AS DIRECTED ON PACKAGE FOR 6 DAYS, Disp: , Rfl:     Multiple Vitamin (MULTIVITAMIN)  "tablet, Take 1 tablet by mouth daily, Disp: , Rfl:     Omega-3 Fatty Acids (OMEGA-3 FISH OIL) 1200 MG CAPS, Take 2 capsules by mouth daily., Disp: , Rfl:     ondansetron (ZOFRAN) 4 mg tablet, Take 1 tablet (4 mg total) by mouth every 8 (eight) hours as needed for nausea or vomiting, Disp: 12 tablet, Rfl: 0    traZODone (DESYREL) 150 mg tablet, 1/2 TAB AT BEDTIME ORALLY 30 DAYS, Disp: , Rfl: 5    Vitamin D, Cholecalciferol, 1000 UNITS CAPS, Take 1 tablet by mouth daily., Disp: , Rfl:     Allergies   Allergen Reactions    Amoxicillin     Clindamycin/Lincomycin     Gentamycin [Gentamicin]     Penicillins Rash            Vitals:    08/15/24 1323   BP: 113/70   Pulse: 98       Objective:  Physical exam  General: Awake, Alert, Oriented  Eyes: Pupils equal, round and reactive to light  Heart: regular rate and rhythm  Lungs: No audible wheezing  Abdomen: soft                    Right Knee Exam     Tenderness   The patient is experiencing tenderness in the lateral joint line and medial joint line.    Range of Motion   Extension:  normal   Flexion:  normal Right knee flexion: with crepitation.    Other   Erythema: absent  Scars: absent  Swelling: mild  Effusion: no effusion present            Diagnostics, reviewed and taken today if performed as documented:    None performed        Procedures, if performed today:    None performed      Portions of the record may have been created with voice recognition software.  Occasional wrong word or \"sound a like\" substitutions may have occurred due to the inherent limitations of voice recognition software.  Read the chart carefully and recognize, using context, where substitutions have occurred.      "

## 2024-10-23 DIAGNOSIS — M25.552 PAIN IN LEFT HIP: Primary | ICD-10-CM

## 2024-11-06 ENCOUNTER — OFFICE VISIT (OUTPATIENT)
Dept: OBGYN CLINIC | Facility: HOSPITAL | Age: 84
End: 2024-11-06
Payer: MEDICARE

## 2024-11-06 ENCOUNTER — HOSPITAL ENCOUNTER (OUTPATIENT)
Dept: RADIOLOGY | Facility: HOSPITAL | Age: 84
Discharge: HOME | End: 2024-11-06
Attending: ORTHOPAEDIC SURGERY
Payer: MEDICARE

## 2024-11-06 VITALS
BODY MASS INDEX: 22.47 KG/M2 | DIASTOLIC BLOOD PRESSURE: 75 MMHG | SYSTOLIC BLOOD PRESSURE: 145 MMHG | HEART RATE: 97 BPM | HEIGHT: 61 IN | WEIGHT: 119 LBS

## 2024-11-06 DIAGNOSIS — M25.552 PAIN IN LEFT HIP: ICD-10-CM

## 2024-11-06 DIAGNOSIS — M16.12 PRIMARY OSTEOARTHRITIS OF ONE HIP, LEFT: Primary | ICD-10-CM

## 2024-11-06 PROCEDURE — 99213 OFFICE O/P EST LOW 20 MIN: CPT | Performed by: ORTHOPAEDIC SURGERY

## 2024-11-06 PROCEDURE — 73502 X-RAY EXAM HIP UNI 2-3 VIEWS: CPT

## 2024-11-06 NOTE — PROGRESS NOTES
Assessment:  1. Primary osteoarthritis of one hip, left  FL injection left hip (non arthrogram)          Plan:  Left hip osteoarthritis  Current symptoms of left anterior groin pain, lateral hip and lumbar pain with instability/fear of falling  Radiograph displays moderate-severe left hip arthritis  The idea of total joint arthroplasty was discussed as a reasonable treatment option if/when conservative care no longer provides meaningful relief and symptoms are unbearable.  Patient to schedule left IA hip steroid injection under imaging  Follow up in 6-8 weeks  Discuss potential total hip arthroplasty if symptoms persist or worsen    To do next visit:  Return in about 8 weeks (around 1/1/2025).    The above stated was discussed in layman's terms and the patient expressed understanding.  All questions were answered to the patient's satisfaction.       Scribe Attestation      I,:  Gregory Modi MA am acting as a scribe while in the presence of the attending physician.:       I,:  Javad Guzmán MD personally performed the services described in this documentation    as scribed in my presence.:               Subjective:   Griselda العلي is a 84 y.o. female who presents for initial evaluation of left hip.  She has had symptoms for several weeks with no injury.  Today she complains of low back pain. left anterior groin and lateral hip pain. She denies radiating leg symptoms.  She rates her pain at 0/10 and 5/10 at its worse.  Transitional positions aggravate while rest alleviates.  She does use Advil 200mg in AM with benefit.  She denies past left hip or lumbar injections or surgery.        Review of systems negative unless otherwise specified in HPI    Past Medical History:   Diagnosis Date    Disease of thyroid gland     Hyperlipidemia     Hypertension        Past Surgical History:   Procedure Laterality Date    BREAST EXCISIONAL BIOPSY Left 1989    benign, guessing year    COLON SURGERY      OTHER SURGICAL  HISTORY      THYROID SURGERY         Family History   Problem Relation Age of Onset    Stroke Mother     Prostate cancer Father 85    No Known Problems Sister     No Known Problems Daughter     No Known Problems Maternal Grandmother     No Known Problems Maternal Grandfather     No Known Problems Paternal Grandmother     No Known Problems Paternal Grandfather     Breast cancer Maternal Aunt         guessing 50-55    No Known Problems Maternal Aunt     No Known Problems Maternal Aunt     No Known Problems Maternal Aunt     No Known Problems Maternal Aunt     No Known Problems Paternal Aunt     No Known Problems Paternal Aunt        Social History     Occupational History    Not on file   Tobacco Use    Smoking status: Never    Smokeless tobacco: Never   Substance and Sexual Activity    Alcohol use: Yes     Alcohol/week: 4.0 - 6.0 standard drinks of alcohol     Types: 2 - 3 Glasses of wine, 2 - 3 Cans of beer per week     Comment: social    Drug use: No    Sexual activity: Not Currently         Current Outpatient Medications:     amLODIPine (NORVASC) 5 mg tablet, Take 5 mg by mouth daily., Disp: , Rfl:     aspirin 81 MG tablet, Take 81 mg by mouth daily., Disp: , Rfl:     calcium-vitamin D (OSCAL) 250-125 MG-UNIT per tablet, Take 1 tablet by mouth daily., Disp: , Rfl:     cefuroxime (CEFTIN) 250 mg tablet, take 1 tablet by mouth every 12 hours for 7 days, Disp: , Rfl:     Cyanocobalamin (VITAMIN B 12 PO), Take 500 mg by mouth daily., Disp: , Rfl:     DAILY MULTIPLE VITAMINS PO, Take 1 tablet by mouth daily, Disp: , Rfl:     DULoxetine (CYMBALTA) 20 mg capsule, Take 20 mg by mouth daily, Disp: , Rfl:     DULoxetine (CYMBALTA) 30 mg delayed release capsule, Take 30 mg by mouth daily, Disp: , Rfl:     DULoxetine (CYMBALTA) 60 mg delayed release capsule, Take 60 mg by mouth daily, Disp: , Rfl:     furosemide (LASIX) 20 mg tablet, Take 20 mg by mouth daily, Disp: , Rfl:     gabapentin (NEURONTIN) 100 mg capsule, Take by  mouth daily, Disp: , Rfl:     lisinopril (ZESTRIL) 10 mg tablet, Take 10 mg by mouth daily, Disp: , Rfl:     loperamide (IMODIUM) 2 mg capsule, TAKE TWO CAPSULES BY MOUTH EVERY MORNING AND TAKE ONE CAPSULE BY MOUTH EVERY EVENING, Disp: , Rfl:     methylPREDNISolone 4 MG tablet therapy pack, TAKE AS DIRECTED ON PACKAGE FOR 6 DAYS, Disp: , Rfl:     Multiple Vitamin (MULTIVITAMIN) tablet, Take 1 tablet by mouth daily, Disp: , Rfl:     Omega-3 Fatty Acids (OMEGA-3 FISH OIL) 1200 MG CAPS, Take 2 capsules by mouth daily., Disp: , Rfl:     ondansetron (ZOFRAN) 4 mg tablet, Take 1 tablet (4 mg total) by mouth every 8 (eight) hours as needed for nausea or vomiting, Disp: 12 tablet, Rfl: 0    traZODone (DESYREL) 150 mg tablet, 1/2 TAB AT BEDTIME ORALLY 30 DAYS, Disp: , Rfl: 5    Vitamin D, Cholecalciferol, 1000 UNITS CAPS, Take 1 tablet by mouth daily., Disp: , Rfl:     Allergies   Allergen Reactions    Amoxicillin     Clindamycin/Lincomycin     Gentamycin [Gentamicin]     Penicillins Rash            Vitals:    11/06/24 1300   BP: 145/75   Pulse: 97       Objective:  Physical exam  General: Awake, Alert, Oriented  Eyes: Pupils equal, round and reactive to light  Heart: regular rate and rhythm  Lungs: No audible wheezing  Abdomen: soft                    Ortho Exam  Right hip:  Leg longer than left  Good arc of motion with no pain    Left hip:  Minimally tender over greater trochanter  Apprehension with ROM  Groin pain with IR  ER with flexion  Calf compartments soft and supple  Sensation intact  Toes are warm sensate and mobile      Diagnostics, reviewed and taken today if performed as documented:    The attending physician has personally reviewed the pertinent films in PACS and interpretation is as follows:  Left hip x-ray:  Moderate-severe arthritic changes with decreased joint space, subchondral sclerosis, osteophyte formation and cystic changes of femoral head.    Procedures, if performed today:    Procedures    None  "performed      Portions of the record may have been created with voice recognition software.  Occasional wrong word or \"sound a like\" substitutions may have occurred due to the inherent limitations of voice recognition software.  Read the chart carefully and recognize, using context, where substitutions have occurred.    "

## 2024-11-07 NOTE — NURSING NOTE
Call placed to patient to discuss upcoming appointment at [facility] radiology department and complete consultation with patient. Patient is having a right hip CSI utilizing fluoroscopy guidance. Reviewed patient's allergies, current anticoagulant medication of ASA 81 mg  present per patient but not required to stop per periprocedural management of coagulation status and hemostasis risk in percutaneous image guided procedure guidelines, also discussed the pre and post procedure expectations. Patient made aware of need for  post procedure if anti anxiety medication is taken, per patient she will not be taking any medications to prevent her from driving. Reminded patient of location and time expected for procedure, Patient expressed understanding by verbalizing and repeating instructions.

## 2024-11-12 ENCOUNTER — HOSPITAL ENCOUNTER (OUTPATIENT)
Dept: RADIOLOGY | Facility: HOSPITAL | Age: 84
Discharge: HOME | End: 2024-11-12
Attending: ORTHOPAEDIC SURGERY
Payer: MEDICARE

## 2024-11-12 DIAGNOSIS — M16.12 PRIMARY OSTEOARTHRITIS OF ONE HIP, LEFT: ICD-10-CM

## 2024-11-12 PROCEDURE — 20610 DRAIN/INJ JOINT/BURSA W/O US: CPT

## 2024-11-12 PROCEDURE — 77002 NEEDLE LOCALIZATION BY XRAY: CPT

## 2024-11-12 RX ORDER — ROPIVACAINE HYDROCHLORIDE 2 MG/ML
10 INJECTION, SOLUTION EPIDURAL; INFILTRATION; PERINEURAL ONCE
Status: COMPLETED | OUTPATIENT
Start: 2024-11-12 | End: 2024-11-12

## 2024-11-12 RX ORDER — LIDOCAINE HYDROCHLORIDE 10 MG/ML
5 INJECTION, SOLUTION EPIDURAL; INFILTRATION; INTRACAUDAL; PERINEURAL
Status: COMPLETED | OUTPATIENT
Start: 2024-11-12 | End: 2024-11-12

## 2024-11-12 RX ORDER — METHYLPREDNISOLONE ACETATE 80 MG/ML
80 INJECTION, SUSPENSION INTRA-ARTICULAR; INTRALESIONAL; INTRAMUSCULAR; SOFT TISSUE
Status: COMPLETED | OUTPATIENT
Start: 2024-11-12 | End: 2024-11-12

## 2024-11-12 RX ADMIN — METHYLPREDNISOLONE ACETATE 80 MG: 80 INJECTION, SUSPENSION INTRA-ARTICULAR; INTRALESIONAL; INTRAMUSCULAR; SOFT TISSUE at 14:50

## 2024-11-12 RX ADMIN — LIDOCAINE HYDROCHLORIDE 4 ML: 10 INJECTION, SOLUTION EPIDURAL; INFILTRATION; INTRACAUDAL; PERINEURAL at 14:50

## 2024-11-12 RX ADMIN — ROPIVACAINE HYDROCHLORIDE 2 ML: 2 INJECTION, SOLUTION EPIDURAL; INFILTRATION at 14:50

## 2024-11-12 RX ADMIN — IOHEXOL 1 ML: 300 INJECTION, SOLUTION INTRAVENOUS at 14:50

## 2024-11-19 ENCOUNTER — PROCEDURE VISIT (OUTPATIENT)
Dept: OBGYN CLINIC | Facility: HOSPITAL | Age: 84
End: 2024-11-19
Payer: MEDICARE

## 2024-11-19 VITALS
DIASTOLIC BLOOD PRESSURE: 77 MMHG | SYSTOLIC BLOOD PRESSURE: 120 MMHG | WEIGHT: 118 LBS | BODY MASS INDEX: 22.28 KG/M2 | HEART RATE: 102 BPM | HEIGHT: 61 IN

## 2024-11-19 DIAGNOSIS — M16.12 PRIMARY OSTEOARTHRITIS OF ONE HIP, LEFT: Primary | ICD-10-CM

## 2024-11-19 DIAGNOSIS — M25.561 CHRONIC PAIN OF RIGHT KNEE: ICD-10-CM

## 2024-11-19 DIAGNOSIS — G89.29 CHRONIC PAIN OF RIGHT KNEE: ICD-10-CM

## 2024-11-19 DIAGNOSIS — M17.11 PRIMARY OSTEOARTHRITIS OF RIGHT KNEE: ICD-10-CM

## 2024-11-19 PROCEDURE — 20610 DRAIN/INJ JOINT/BURSA W/O US: CPT | Performed by: ORTHOPAEDIC SURGERY

## 2024-11-19 PROCEDURE — 99213 OFFICE O/P EST LOW 20 MIN: CPT | Performed by: ORTHOPAEDIC SURGERY

## 2024-11-19 NOTE — PROGRESS NOTES
Assessment:  1. Primary osteoarthritis of one hip, left        2. Chronic pain of right knee        3. Primary osteoarthritis of right knee            Plan:  Right knee Durolane  The patient was provided with right knee Durolane injection.  The patient tolerated the procedure well.      Left hip osteoarthritis  Patient found significant relief from 11/12/2024 left hip IA steroid injection allowing patient to be able to stand for entire [employer] service.  Follow up in 2 months    To do next visit:  Return in about 2 months (around 1/19/2025) for Recheck of left hip.    The above stated was discussed in layman's terms and the patient expressed understanding.  All questions were answered to the patient's satisfaction.       Scribe Attestation      I,:  Gregory Modi MA am acting as a scribe while in the presence of the attending physician.:       I,:  Javad Guzmán MD personally performed the services described in this documentation    as scribed in my presence.:               Subjective:   Griselda العلي is a 84 y.o. female who presents for follow up of right knee and Durolane.  Today she complains of ongoing right knee pain.  Prolonged weight bearing and repetitive bending aggravates while rest alleviates.  The patient rates their pain at 7/10 and above at times.    She is also s/p left IA hp steroid injection under imaging, 11/12/2024.  Today she complains of lesser left anterior groin pain.  She has been able to stand through a full [employer] service when prior she was not able to do this.        Review of systems negative unless otherwise specified in HPI    Past Medical History:   Diagnosis Date    Disease of thyroid gland     Hyperlipidemia     Hypertension        Past Surgical History:   Procedure Laterality Date    BREAST EXCISIONAL BIOPSY Left 1989    benign, guessing year    COLON SURGERY      FL INJECTION LEFT HIP (NON ARTHROGRAM)  11/12/2024    OTHER SURGICAL HISTORY      THYROID SURGERY          Family History   Problem Relation Age of Onset    Stroke Mother     Prostate cancer Father 85    No Known Problems Sister     No Known Problems Daughter     No Known Problems Maternal Grandmother     No Known Problems Maternal Grandfather     No Known Problems Paternal Grandmother     No Known Problems Paternal Grandfather     Breast cancer Maternal Aunt         guessing 50-55    No Known Problems Maternal Aunt     No Known Problems Maternal Aunt     No Known Problems Maternal Aunt     No Known Problems Maternal Aunt     No Known Problems Paternal Aunt     No Known Problems Paternal Aunt        Social History     Occupational History    Not on file   Tobacco Use    Smoking status: Never    Smokeless tobacco: Never   Substance and Sexual Activity    Alcohol use: Yes     Alcohol/week: 4.0 - 6.0 standard drinks of alcohol     Types: 2 - 3 Glasses of wine, 2 - 3 Cans of beer per week     Comment: social    Drug use: No    Sexual activity: Not Currently         Current Outpatient Medications:     amLODIPine (NORVASC) 5 mg tablet, Take 5 mg by mouth daily., Disp: , Rfl:     aspirin 81 MG tablet, Take 81 mg by mouth daily., Disp: , Rfl:     calcium-vitamin D (OSCAL) 250-125 MG-UNIT per tablet, Take 1 tablet by mouth daily., Disp: , Rfl:     cefuroxime (CEFTIN) 250 mg tablet, take 1 tablet by mouth every 12 hours for 7 days, Disp: , Rfl:     Cyanocobalamin (VITAMIN B 12 PO), Take 500 mg by mouth daily., Disp: , Rfl:     DAILY MULTIPLE VITAMINS PO, Take 1 tablet by mouth daily, Disp: , Rfl:     DULoxetine (CYMBALTA) 20 mg capsule, Take 20 mg by mouth daily, Disp: , Rfl:     DULoxetine (CYMBALTA) 30 mg delayed release capsule, Take 30 mg by mouth daily, Disp: , Rfl:     DULoxetine (CYMBALTA) 60 mg delayed release capsule, Take 60 mg by mouth daily, Disp: , Rfl:     furosemide (LASIX) 20 mg tablet, Take 20 mg by mouth daily, Disp: , Rfl:     gabapentin (NEURONTIN) 100 mg capsule, Take by mouth daily, Disp: , Rfl:      "lisinopril (ZESTRIL) 10 mg tablet, Take 10 mg by mouth daily, Disp: , Rfl:     loperamide (IMODIUM) 2 mg capsule, TAKE TWO CAPSULES BY MOUTH EVERY MORNING AND TAKE ONE CAPSULE BY MOUTH EVERY EVENING, Disp: , Rfl:     methylPREDNISolone 4 MG tablet therapy pack, TAKE AS DIRECTED ON PACKAGE FOR 6 DAYS, Disp: , Rfl:     Multiple Vitamin (MULTIVITAMIN) tablet, Take 1 tablet by mouth daily, Disp: , Rfl:     Omega-3 Fatty Acids (OMEGA-3 FISH OIL) 1200 MG CAPS, Take 2 capsules by mouth daily., Disp: , Rfl:     ondansetron (ZOFRAN) 4 mg tablet, Take 1 tablet (4 mg total) by mouth every 8 (eight) hours as needed for nausea or vomiting, Disp: 12 tablet, Rfl: 0    traZODone (DESYREL) 150 mg tablet, 1/2 TAB AT BEDTIME ORALLY 30 DAYS, Disp: , Rfl: 5    Vitamin D, Cholecalciferol, 1000 UNITS CAPS, Take 1 tablet by mouth daily., Disp: , Rfl:     Allergies   Allergen Reactions    Amoxicillin     Clindamycin/Lincomycin     Gentamycin [Gentamicin]     Penicillins Rash            Vitals:    11/19/24 1418   BP: 120/77   Pulse: 102       Objective:  Physical exam  General: Awake, Alert, Oriented  Eyes: Pupils equal, round and reactive to light  Heart: regular rate and rhythm  Lungs: No audible wheezing  Abdomen: soft                    Ortho Exam  Right knee:  No erythema or ecchymosis  No effusion or swelling  Normal strength  Good ROM with crepitus   Calf compartments soft and supple  Sensation intact  Toes are warm sensate and mobile      Diagnostics, reviewed and taken today if performed as documented:    None performed     Procedures, if performed today:    Large joint arthrocentesis: R knee  Universal Protocol:  Consent: Verbal consent obtained.  Risks and benefits: risks, benefits and alternatives were discussed  Consent given by: patient  Time out: Immediately prior to procedure a \"time out\" was called to verify the correct patient, procedure, equipment, support staff and site/side marked as required.  Timeout called at: " "11/19/2024 2:44 PM.  Patient understanding: patient states understanding of the procedure being performed  Site marked: the operative site was marked  Patient identity confirmed: verbally with patient  Supporting Documentation  Indications: pain   Procedure Details  Location: knee - R knee  Preparation: Patient was prepped and draped in the usual sterile fashion  Needle size: 22 G  Ultrasound guidance: no  Approach: anterolateral  Medications administered: 3 mL sodium hyaluronate 60 MG/3ML    Patient tolerance: patient tolerated the procedure well with no immediate complications  Dressing:  Sterile dressing applied              Portions of the record may have been created with voice recognition software.  Occasional wrong word or \"sound a like\" substitutions may have occurred due to the inherent limitations of voice recognition software.  Read the chart carefully and recognize, using context, where substitutions have occurred.    "

## 2025-01-21 ENCOUNTER — OFFICE VISIT (OUTPATIENT)
Dept: OBGYN CLINIC | Facility: HOSPITAL | Age: 85
End: 2025-01-21
Payer: MEDICARE

## 2025-01-21 VITALS — BODY MASS INDEX: 22.47 KG/M2 | WEIGHT: 119 LBS | HEIGHT: 61 IN

## 2025-01-21 DIAGNOSIS — M17.11 PRIMARY OSTEOARTHRITIS OF RIGHT KNEE: ICD-10-CM

## 2025-01-21 DIAGNOSIS — M16.12 PRIMARY OSTEOARTHRITIS OF ONE HIP, LEFT: Primary | ICD-10-CM

## 2025-01-21 DIAGNOSIS — M25.561 CHRONIC PAIN OF RIGHT KNEE: ICD-10-CM

## 2025-01-21 DIAGNOSIS — G89.29 CHRONIC PAIN OF RIGHT KNEE: ICD-10-CM

## 2025-01-21 PROCEDURE — 99213 OFFICE O/P EST LOW 20 MIN: CPT | Performed by: ORTHOPAEDIC SURGERY

## 2025-01-21 RX ORDER — NICOTINE 14MG/24HR
PATCH, TRANSDERMAL 24 HOURS TRANSDERMAL
COMMUNITY

## 2025-01-21 NOTE — PROGRESS NOTES
Assessment:  1. Primary osteoarthritis of one hip, left  FL injection left hip (non arthrogram)      2. Chronic pain of right knee  Injection procedure prior authorization      3. Primary osteoarthritis of right knee  Injection procedure prior authorization          Plan:  Left hip osteoarthritis  The patient will schedule left IA hip steroid injection under imaging as this has provided significant relief in past.    Right knee osteoarthritis  Right knee visco-supplement was ordered as this had provided significant decrease of pain, inflammation and crepitus with last application.  The patient has on-going knee pain and stiffness in which interferes with their daily activity and sleep.  Examination of knee includes crepitus with range of motion.  The patient rates their pain a 10/10 at times.  The patient has tried home exercise program learned from past physical therapy.  Bracing has not provided relief.  The patient has tried oral medications and steroid injections with limited benefit.  The patient has been counseled on weight loss.    Follow up in 3 months    To do next visit:  Return in about 3 months (around 4/21/2025) for visco-supplementation.    The above stated was discussed in layman's terms and the patient expressed understanding.  All questions were answered to the patient's satisfaction.       Scribe Attestation      I,:  Gregory Modi MA am acting as a scribe while in the presence of the attending physician.:       I,:  Javad Guzmán MD personally performed the services described in this documentation    as scribed in my presence.:               Subjective:   Griselda العلي is a 84 y.o. female who presents for follow up of right knee and left hip. She is s/p left IA hip steroid injection 11/12/2024.  She is s/p right knee Durolane with benefit, 11/19/2024.  Today she complains of increasing left anterior groin and right generalized knee pain.  Prolonged eight bearing aggravates while rest  alleviates.  The patient rates their pain at 7/10 and above at times.        Review of systems negative unless otherwise specified in HPI    Past Medical History:   Diagnosis Date    Disease of thyroid gland     Hyperlipidemia     Hypertension        Past Surgical History:   Procedure Laterality Date    BREAST EXCISIONAL BIOPSY Left 1989    benign, guessing year    COLON SURGERY      FL INJECTION LEFT HIP (NON ARTHROGRAM)  11/12/2024    OTHER SURGICAL HISTORY      THYROID SURGERY         Family History   Problem Relation Age of Onset    Stroke Mother     Prostate cancer Father 85    No Known Problems Sister     No Known Problems Daughter     No Known Problems Maternal Grandmother     No Known Problems Maternal Grandfather     No Known Problems Paternal Grandmother     No Known Problems Paternal Grandfather     Breast cancer Maternal Aunt         guessing 50-55    No Known Problems Maternal Aunt     No Known Problems Maternal Aunt     No Known Problems Maternal Aunt     No Known Problems Maternal Aunt     No Known Problems Paternal Aunt     No Known Problems Paternal Aunt        Social History     Occupational History    Not on file   Tobacco Use    Smoking status: Never    Smokeless tobacco: Never   Substance and Sexual Activity    Alcohol use: Yes     Alcohol/week: 4.0 - 6.0 standard drinks of alcohol     Types: 2 - 3 Glasses of wine, 2 - 3 Cans of beer per week     Comment: social    Drug use: No    Sexual activity: Not Currently         Current Outpatient Medications:     amLODIPine (NORVASC) 5 mg tablet, Take 5 mg by mouth daily., Disp: , Rfl:     aspirin 81 MG tablet, Take 81 mg by mouth daily., Disp: , Rfl:     calcium-vitamin D (OSCAL) 250-125 MG-UNIT per tablet, Take 1 tablet by mouth daily., Disp: , Rfl:     cefuroxime (CEFTIN) 250 mg tablet, take 1 tablet by mouth every 12 hours for 7 days, Disp: , Rfl:     Cyanocobalamin (VITAMIN B 12 PO), Take 500 mg by mouth daily., Disp: , Rfl:     DAILY MULTIPLE  VITAMINS PO, Take 1 tablet by mouth daily, Disp: , Rfl:     DULoxetine (CYMBALTA) 20 mg capsule, Take 20 mg by mouth daily, Disp: , Rfl:     DULoxetine (CYMBALTA) 30 mg delayed release capsule, Take 30 mg by mouth daily, Disp: , Rfl:     DULoxetine (CYMBALTA) 60 mg delayed release capsule, Take 60 mg by mouth daily, Disp: , Rfl:     furosemide (LASIX) 20 mg tablet, Take 20 mg by mouth daily, Disp: , Rfl:     gabapentin (NEURONTIN) 100 mg capsule, Take by mouth daily, Disp: , Rfl:     lisinopril (ZESTRIL) 10 mg tablet, Take 10 mg by mouth daily, Disp: , Rfl:     loperamide (IMODIUM) 2 mg capsule, TAKE TWO CAPSULES BY MOUTH EVERY MORNING AND TAKE ONE CAPSULE BY MOUTH EVERY EVENING, Disp: , Rfl:     methylPREDNISolone 4 MG tablet therapy pack, TAKE AS DIRECTED ON PACKAGE FOR 6 DAYS, Disp: , Rfl:     Multiple Vitamin (MULTIVITAMIN) tablet, Take 1 tablet by mouth daily, Disp: , Rfl:     Omega-3 Fatty Acids (OMEGA-3 FISH OIL) 1200 MG CAPS, Take 2 capsules by mouth daily., Disp: , Rfl:     ondansetron (ZOFRAN) 4 mg tablet, Take 1 tablet (4 mg total) by mouth every 8 (eight) hours as needed for nausea or vomiting, Disp: 12 tablet, Rfl: 0    Saccharomyces boulardii (Probiotic) 250 MG CAPS, Take by mouth, Disp: , Rfl:     traZODone (DESYREL) 150 mg tablet, 1/2 TAB AT BEDTIME ORALLY 30 DAYS, Disp: , Rfl: 5    Vitamin D, Cholecalciferol, 1000 UNITS CAPS, Take 1 tablet by mouth daily., Disp: , Rfl:     Allergies   Allergen Reactions    Amoxicillin     Clindamycin/Lincomycin     Gentamycin [Gentamicin]     Penicillins Rash          There were no vitals filed for this visit.    Objective:  Physical exam  General: Awake, Alert, Oriented  Eyes: Pupils equal, round and reactive to light  Heart: regular rate and rhythm  Lungs: No audible wheezing  Abdomen: soft                    Ortho Exam  Right knee:  No erythema or ecchymosis  No effusion or swelling  Normal strength  Good ROM with crepitus   Calf compartments soft and  "supple  Sensation intact  Toes are warm sensate and mobile    Left hip:  Apprehension with ROM  Groin pain with IR  ER with flexion  Calf compartments soft and supple  Sensation intact  Toes are warm sensate and mobile      Diagnostics, reviewed and taken today if performed as documented:    None performed     Procedures, if performed today:    Procedures    None performed      Portions of the record may have been created with voice recognition software.  Occasional wrong word or \"sound a like\" substitutions may have occurred due to the inherent limitations of voice recognition software.  Read the chart carefully and recognize, using context, where substitutions have occurred.    "

## 2025-02-04 NOTE — NURSING NOTE
Unable to reach patient, sent instructions and directions in e-mail that is linked to this account and via epic my chart. See message below.  Upcoming Radiology appointment at [facility]  Good morning Mrs. العلي,                   You have an upcoming radiology appointment at [facility]. We are located at [address] [place] [address]. Your appointment is scheduled for 2/14/25  @ 2 pm you are scheduled to have a left hip steroid injection. You will need to enter [address], come up to the 1st floor and locate the Radiology department. Registration is in the Radiology department, please arrive 15 minutes prior so you may go through the registration process.     For this test you may eat, drink, take all your usual medications, including your 81 mg baby aspirin. In regard to driving - if you are not taking any medication for anti-anxiety for the procedure you may drive yourself. BUT  if you are taking medications for anxiety (Xanax, Ativan etc.)  for the procedure you will need a .     If you have any question or concerns, If you have any questions or concerns regarding the above information,  please feel free to send me a response via a call, email or Button chart.      If we have not spoken yet and understand the above information and have no further questions or concerns can you please send me a response via a call, email or Button chart that you have received and understood the information.     May you have a good day,   Ashanti Nguyen RN  [facility] Radiology RN  [address] [place] [id number]  [phone] (Office)  [phone] (Fax)  [email or web address]    Patient returned call and confirmed the appointment. Had no questions or concerns regarding the procedure.

## 2025-02-14 ENCOUNTER — HOSPITAL ENCOUNTER (OUTPATIENT)
Dept: RADIOLOGY | Facility: HOSPITAL | Age: 85
Discharge: HOME | End: 2025-02-14
Attending: ORTHOPAEDIC SURGERY
Payer: MEDICARE

## 2025-02-14 DIAGNOSIS — M16.12 PRIMARY OSTEOARTHRITIS OF ONE HIP, LEFT: ICD-10-CM

## 2025-02-14 PROCEDURE — 77002 NEEDLE LOCALIZATION BY XRAY: CPT

## 2025-02-14 PROCEDURE — 20610 DRAIN/INJ JOINT/BURSA W/O US: CPT

## 2025-02-14 RX ORDER — ROPIVACAINE HYDROCHLORIDE 2 MG/ML
10 INJECTION, SOLUTION EPIDURAL; INFILTRATION; PERINEURAL ONCE
Status: COMPLETED | OUTPATIENT
Start: 2025-02-14 | End: 2025-02-14

## 2025-02-14 RX ORDER — LIDOCAINE HYDROCHLORIDE 10 MG/ML
5 INJECTION, SOLUTION EPIDURAL; INFILTRATION; INTRACAUDAL; PERINEURAL
Status: COMPLETED | OUTPATIENT
Start: 2025-02-14 | End: 2025-02-14

## 2025-02-14 RX ORDER — METHYLPREDNISOLONE ACETATE 80 MG/ML
80 INJECTION, SUSPENSION INTRA-ARTICULAR; INTRALESIONAL; INTRAMUSCULAR; SOFT TISSUE
Status: COMPLETED | OUTPATIENT
Start: 2025-02-14 | End: 2025-02-14

## 2025-02-14 RX ADMIN — METHYLPREDNISOLONE ACETATE 1 MG: 80 INJECTION, SUSPENSION INTRA-ARTICULAR; INTRALESIONAL; INTRAMUSCULAR; SOFT TISSUE at 14:21

## 2025-02-14 RX ADMIN — ROPIVACAINE HYDROCHLORIDE 2 ML: 2 INJECTION, SOLUTION EPIDURAL; INFILTRATION at 14:20

## 2025-02-14 RX ADMIN — LIDOCAINE HYDROCHLORIDE 3 ML: 10 INJECTION, SOLUTION EPIDURAL; INFILTRATION; INTRACAUDAL; PERINEURAL at 14:21

## 2025-02-14 RX ADMIN — IOHEXOL 1 ML: 300 INJECTION, SOLUTION INTRAVENOUS at 14:20

## 2025-04-01 ENCOUNTER — TELEPHONE (OUTPATIENT)
Dept: OBGYN CLINIC | Facility: HOSPITAL | Age: 85
End: 2025-04-01

## 2025-04-01 NOTE — TELEPHONE ENCOUNTER
Caller: Patient    Doctor: Dr. Guzmán    Reason for call: Patient is requesting a referral for a FL injection of the left hip.     Call back#: [phone]

## 2025-04-01 NOTE — TELEPHONE ENCOUNTER
Attempted to call patient back. No answer. Left voicemail to call back to discuss.  Last injection was only 6 weeks ago. She can not have another one yet. If ongoing pain she can FU with  to discuss other options.

## 2025-04-01 NOTE — TELEPHONE ENCOUNTER
Last hip injection about 6 weeks ago. Patient note she has minimal pain and is feeling quite good. I advised the soonest another injection could be done is another 6 weeks from now, however I advised we would only adivse this if she had return of her pain. Advised we dont do these routinely every 3 months if patients are asymptomatic. She was not aware of this. She will monitor her symptoms for now and arya if and when he pain starts to return.

## 2025-05-14 DIAGNOSIS — M16.12 PRIMARY OSTEOARTHRITIS OF ONE HIP, LEFT: Primary | ICD-10-CM

## 2025-05-16 ENCOUNTER — TELEPHONE (OUTPATIENT)
Dept: RADIOLOGY | Facility: HOSPITAL | Age: 85
End: 2025-05-16

## 2025-05-16 NOTE — NURSING NOTE
Call placed to pt to discuss upcoming appointment at [facility] Radiology Department and consultation completed.  Pt is having a L Hip Non-Arthrogram completed on 5/28/2025.  Allergies reviewed and verified pt does not currently take any anticoagulant medications.  Pre procedure instructions including diet and taking own medications discussed with pt.  Pt instructed that she may eat normally and take medications as usual before the procedure.  Pt verbalized understanding of instructions given.  Reminded pt of the location, date and time of procedure.  My number was given to call if any questions or concerns arise pre or post procedure.

## 2025-05-27 ENCOUNTER — PROCEDURE VISIT (OUTPATIENT)
Dept: OBGYN CLINIC | Facility: HOSPITAL | Age: 85
End: 2025-05-27
Payer: MEDICARE

## 2025-05-27 VITALS — BODY MASS INDEX: 22.09 KG/M2 | HEIGHT: 61 IN | WEIGHT: 117 LBS

## 2025-05-27 DIAGNOSIS — M16.12 PRIMARY OSTEOARTHRITIS OF ONE HIP, LEFT: ICD-10-CM

## 2025-05-27 DIAGNOSIS — M17.11 PRIMARY OSTEOARTHRITIS OF RIGHT KNEE: Primary | ICD-10-CM

## 2025-05-27 PROCEDURE — 99214 OFFICE O/P EST MOD 30 MIN: CPT | Performed by: ORTHOPAEDIC SURGERY

## 2025-05-27 PROCEDURE — 20610 DRAIN/INJ JOINT/BURSA W/O US: CPT

## 2025-05-27 RX ORDER — LIDOCAINE HYDROCHLORIDE 10 MG/ML
4 INJECTION, SOLUTION INFILTRATION; PERINEURAL
Status: COMPLETED | OUTPATIENT
Start: 2025-05-27 | End: 2025-05-27

## 2025-05-27 RX ADMIN — LIDOCAINE HYDROCHLORIDE 4 ML: 10 INJECTION, SOLUTION INFILTRATION; PERINEURAL at 16:00

## 2025-05-27 NOTE — PROGRESS NOTES
Name: Griselda العلي      : 1940       MRN: [id number]   Encounter Provider: Javad Guzmán MD   Encounter Date: 25  Encounter department: [facility] [place]     ASSESSMENT & PLAN:  Assessment & Plan  Primary osteoarthritis of right knee  Offered, accepted, provided with injection of viscosupplementation to the right knee today which she tolerated well  Continue weightbearing activities as tolerated  Ice and postinjection protocol advised  Follow-up in about 3 months  Orders:  •  Large joint arthrocentesis: R knee  •  sodium hyaluronate (DUROLANE) injection 3 mL  •  lidocaine (XYLOCAINE) 1 % injection 4 mL    Primary osteoarthritis of one hip, left  Known osteoarthritis of the left hip, presents today for further discussion of possible surgical intervention  Continues to find relief from intermittent injections of corticosteroid with radiology however understands total hip arthroplasty will relieve current symptoms  Discussed in detail with patient regarding surgical process and recovery  Patient expressed understanding, is hopeful to schedule surgery in 2026  At next visit, plan for repeat x-rays of the left hip            To do next visit:  Return in about 3 months (around 2025) for right knee and left hip.    _____________________________________________________  CHIEF COMPLAINT:  Chief Complaint   Patient presents with   • Right Knee - Follow-up     Durolane         SUBJECTIVE:  Griselda العلي is a 84 y.o. female who presents for administration of visco supplementation to the right knee.  Patient has known osteoarthritis of the right knee which has been treated in the past with intermittent injections of corticosteroid and visco supplementation.  Durolane provided to the right knee today which she tolerated well.  Patient also has known osteoarthritis of the left hip, which she has been treating with intermittent injections of corticosteroid via  "radiology as well as activity modification.  She continues to express severe and unrelenting pain of the left hip that is worse with weightbearing activities.  Patient had multiple questions regarding total hip arthroplasty, all of which were answered during today's visit and prolonged time was spent with the patient detailing the surgical process and recovery.  Patient expressed understanding, and is hopeful to schedule surgery for January 2026.  Pain score 7/10          PAST MEDICAL HISTORY:  Past Medical History[1]    PAST SURGICAL HISTORY:  Past Surgical History[2]    FAMILY HISTORY:  Family History[3]    SOCIAL HISTORY:  Social History[4]    MEDICATIONS:  Current Medications[5]    ALLERGIES:  Allergies[6]    LABS:  HgA1c: No results found for: \"HGBA1C\"  BMP:   Lab Results   Component Value Date    GLUCOSE 100 05/05/2015    CALCIUM 9.7 06/05/2022     05/05/2015    K 4.3 06/05/2022    CO2 27 06/05/2022     06/05/2022    BUN 22 06/05/2022    CREATININE 0.92 06/05/2022     CBC: No components found for: \"CBC\"    _____________________________________________________  PHYSICAL EXAMINATION:  Vital signs: Ht 5' 1.2\" (1.554 m)   Wt 53.1 kg (117 lb)   BMI 21.96 kg/m²   General: No acute distress, awake and alert  Psychiatric: Mood and affect appear appropriate  HEENT: Trachea Midline, No torticollis, no apparent facial trauma  Cardiovascular: No audible murmurs; Extremities appear perfused  Pulmonary: No audible wheezing or stridor  Skin: No open lesions; see further details (if any) below    MUSCULOSKELETAL EXAMINATION:  Right Knee Exam     Tenderness   The patient is experiencing tenderness in the lateral joint line and medial joint line.    Range of Motion   Extension:  0   Flexion:  110 (with crepitation and stiffness)     Other   Erythema: absent  Scars: absent  Swelling: mild  Effusion: no effusion present      Left Hip Exam     Range of Motion   Abduction:  abnormal   Adduction:  abnormal   Extension: "  abnormal   Flexion:  abnormal   External rotation:  abnormal   Internal rotation: abnormal     Other   Erythema: absent  Scars: absent    Comments:  Limited ROM due to pain and restriction              ___________________________________________________  STUDIES REVIEWED:  I personally reviewed the images obtained in office today and my independent interpretation is as follows:    None performed      PROCEDURES PERFORMED:    Large joint arthrocentesis: R knee    Performed by: Tori Oliver PA-C  Authorized by: Tori Oliver PA-C    Universal Protocol:  Consent: Verbal consent obtained  Risks and benefits: risks, benefits and alternatives were discussed  Consent given by: patient  Site marked: the operative site was marked  Supporting Documentation  Indications: pain     Is this a Visco injection? Yes  Non-Pharmacologic Treatments Attempted: Diet, Physical Therapy, Home Exercise and Weight Management Counseling  Pharmacologic Treatments Attempted: Cortisone injections, oral anti-inflammatories  Pain Score: 7Procedure Details  Location: knee - R knee  Needle size: 22 G  Medications administered: 4 mL lidocaine 1 %; 3 mL sodium hyaluronate 60 MG/3ML    Patient tolerance: patient tolerated the procedure well with no immediate complications  Dressing:  Sterile dressing applied            Tori Oliver PA-C         [1]  Past Medical History:  Diagnosis Date   • Disease of thyroid gland    • Hyperlipidemia    • Hypertension    [2]  Past Surgical History:  Procedure Laterality Date   • BREAST EXCISIONAL BIOPSY Left 1989    benign, guessing year   • COLON SURGERY     • FL INJECTION LEFT HIP (NON ARTHROGRAM)  11/12/2024   • FL INJECTION LEFT HIP (NON ARTHROGRAM)  2/14/2025   • OTHER SURGICAL HISTORY     • THYROID SURGERY     [3]  Family History  Problem Relation Name Age of Onset   • Stroke Mother     • Prostate cancer Father  85   • No Known Problems Sister aldair    • No Known Problems Daughter luci    • No Known Problems  Maternal Grandmother     • No Known Problems Maternal Grandfather     • No Known Problems Paternal Grandmother     • No Known Problems Paternal Grandfather     • Breast cancer Maternal Aunt yasmine         guessing 50-55   • No Known Problems Maternal Aunt terry    • No Known Problems Maternal Aunt ermelinda    • No Known Problems Maternal Aunt eddie    • No Known Problems Maternal Aunt nargis    • No Known Problems Paternal Aunt ermelinda    • No Known Problems Paternal Aunt fabrice    [4]  Social History  Tobacco Use   • Smoking status: Never   • Smokeless tobacco: Never   Substance Use Topics   • Alcohol use: Yes     Alcohol/week: 4.0 - 6.0 standard drinks of alcohol     Types: 2 - 3 Glasses of wine, 2 - 3 Cans of beer per week     Comment: social   • Drug use: No   [5]    Current Outpatient Medications:   •  amLODIPine (NORVASC) 5 mg tablet, Take 5 mg by mouth daily., Disp: , Rfl:   •  aspirin 81 MG tablet, Take 81 mg by mouth daily., Disp: , Rfl:   •  calcium-vitamin D (OSCAL) 250-125 MG-UNIT per tablet, Take 1 tablet by mouth daily., Disp: , Rfl:   •  cefuroxime (CEFTIN) 250 mg tablet, take 1 tablet by mouth every 12 hours for 7 days, Disp: , Rfl:   •  Cyanocobalamin (VITAMIN B 12 PO), Take 500 mg by mouth daily., Disp: , Rfl:   •  DAILY MULTIPLE VITAMINS PO, Take 1 tablet by mouth daily, Disp: , Rfl:   •  DULoxetine (CYMBALTA) 20 mg capsule, Take 20 mg by mouth daily, Disp: , Rfl:   •  DULoxetine (CYMBALTA) 30 mg delayed release capsule, Take 30 mg by mouth daily, Disp: , Rfl:   •  DULoxetine (CYMBALTA) 60 mg delayed release capsule, Take 60 mg by mouth daily, Disp: , Rfl:   •  furosemide (LASIX) 20 mg tablet, Take 20 mg by mouth daily, Disp: , Rfl:   •  gabapentin (NEURONTIN) 100 mg capsule, Take by mouth daily, Disp: , Rfl:   •  lisinopril (ZESTRIL) 10 mg tablet, Take 10 mg by mouth daily, Disp: , Rfl:   •  loperamide (IMODIUM) 2 mg capsule, TAKE TWO CAPSULES BY MOUTH EVERY MORNING AND TAKE ONE CAPSULE BY MOUTH  EVERY EVENING, Disp: , Rfl:   •  methylPREDNISolone 4 MG tablet therapy pack, TAKE AS DIRECTED ON PACKAGE FOR 6 DAYS, Disp: , Rfl:   •  Multiple Vitamin (MULTIVITAMIN) tablet, Take 1 tablet by mouth daily, Disp: , Rfl:   •  Omega-3 Fatty Acids (OMEGA-3 FISH OIL) 1200 MG CAPS, Take 2 capsules by mouth daily., Disp: , Rfl:   •  ondansetron (ZOFRAN) 4 mg tablet, Take 1 tablet (4 mg total) by mouth every 8 (eight) hours as needed for nausea or vomiting, Disp: 12 tablet, Rfl: 0  •  Saccharomyces boulardii (Probiotic) 250 MG CAPS, Take by mouth, Disp: , Rfl:   •  traZODone (DESYREL) 150 mg tablet, 1/2 TAB AT BEDTIME ORALLY 30 DAYS, Disp: , Rfl: 5  •  Vitamin D, Cholecalciferol, 1000 UNITS CAPS, Take 1 tablet by mouth daily., Disp: , Rfl:   No current facility-administered medications for this visit.[6]  Allergies  Allergen Reactions   • Amoxicillin    • Clindamycin/Lincomycin    • Gentamicin Other (See Comments)   • Penicillins Rash

## 2025-05-28 ENCOUNTER — HOSPITAL ENCOUNTER (OUTPATIENT)
Dept: RADIOLOGY | Facility: HOSPITAL | Age: 85
Discharge: HOME | End: 2025-05-28
Attending: ORTHOPAEDIC SURGERY
Payer: MEDICARE

## 2025-05-28 DIAGNOSIS — M16.12 PRIMARY OSTEOARTHRITIS OF ONE HIP, LEFT: ICD-10-CM

## 2025-05-28 PROCEDURE — 77002 NEEDLE LOCALIZATION BY XRAY: CPT

## 2025-05-28 PROCEDURE — 20610 DRAIN/INJ JOINT/BURSA W/O US: CPT

## 2025-05-28 RX ORDER — METHYLPREDNISOLONE ACETATE 80 MG/ML
80 INJECTION, SUSPENSION INTRA-ARTICULAR; INTRALESIONAL; INTRAMUSCULAR; SOFT TISSUE
Status: COMPLETED | OUTPATIENT
Start: 2025-05-28 | End: 2025-05-28

## 2025-05-28 RX ORDER — ROPIVACAINE HYDROCHLORIDE 2 MG/ML
5 INJECTION, SOLUTION EPIDURAL; INFILTRATION; PERINEURAL ONCE
Status: COMPLETED | OUTPATIENT
Start: 2025-05-28 | End: 2025-05-28

## 2025-05-28 RX ORDER — LIDOCAINE HYDROCHLORIDE 10 MG/ML
10 INJECTION, SOLUTION EPIDURAL; INFILTRATION; INTRACAUDAL; PERINEURAL
Status: COMPLETED | OUTPATIENT
Start: 2025-05-28 | End: 2025-05-28

## 2025-05-28 RX ADMIN — METHYLPREDNISOLONE ACETATE 80 MG: 80 INJECTION, SUSPENSION INTRA-ARTICULAR; INTRALESIONAL; INTRAMUSCULAR; SOFT TISSUE at 15:05

## 2025-05-28 RX ADMIN — ROPIVACAINE HYDROCHLORIDE 2 ML: 2 INJECTION, SOLUTION EPIDURAL; INFILTRATION at 15:05

## 2025-05-28 RX ADMIN — IOHEXOL 2 ML: 300 INJECTION, SOLUTION INTRAVENOUS at 15:05

## 2025-05-28 RX ADMIN — LIDOCAINE HYDROCHLORIDE 7 ML: 10 INJECTION, SOLUTION EPIDURAL; INFILTRATION; INTRACAUDAL; PERINEURAL at 15:05

## 2025-08-05 DIAGNOSIS — S80.12XA CONTUSION OF LEFT LOWER LEG, INITIAL ENCOUNTER: ICD-10-CM

## 2025-08-06 ENCOUNTER — HOSPITAL ENCOUNTER (OUTPATIENT)
Dept: RADIOLOGY | Facility: HOSPITAL | Age: 85
Discharge: HOME | End: 2025-08-06
Payer: MEDICARE

## 2025-08-06 DIAGNOSIS — S80.12XA CONTUSION OF LEFT LOWER LEG, INITIAL ENCOUNTER: ICD-10-CM

## 2025-08-06 PROCEDURE — 73590 X-RAY EXAM OF LOWER LEG: CPT

## 2025-08-26 PROBLEM — Z47.1 AFTERCARE FOLLOWING LEFT HIP JOINT REPLACEMENT SURGERY: Status: ACTIVE | Noted: 2025-08-26

## 2025-08-26 PROBLEM — Z96.642 AFTERCARE FOLLOWING LEFT HIP JOINT REPLACEMENT SURGERY: Status: ACTIVE | Noted: 2025-08-26

## 2025-08-26 PROBLEM — Z01.810 PRE-OPERATIVE CARDIOVASCULAR EXAMINATION: Status: ACTIVE | Noted: 2025-08-26

## 2025-08-26 PROBLEM — G89.29 CHRONIC LEFT HIP PAIN: Status: ACTIVE | Noted: 2025-08-26

## 2025-08-26 PROBLEM — Z01.812 PRE-OPERATIVE LABORATORY EXAMINATION: Status: ACTIVE | Noted: 2025-08-26

## 2025-08-26 PROBLEM — M25.552 CHRONIC LEFT HIP PAIN: Status: ACTIVE | Noted: 2025-08-26

## 2025-08-26 PROBLEM — M16.12 PRIMARY OSTEOARTHRITIS OF ONE HIP, LEFT: Status: ACTIVE | Noted: 2025-08-26
